# Patient Record
Sex: FEMALE | Race: WHITE | Employment: OTHER | URBAN - METROPOLITAN AREA
[De-identification: names, ages, dates, MRNs, and addresses within clinical notes are randomized per-mention and may not be internally consistent; named-entity substitution may affect disease eponyms.]

---

## 2017-01-04 ENCOUNTER — TELEPHONE (OUTPATIENT)
Dept: ENDOCRINOLOGY | Age: 62
End: 2017-01-04

## 2017-01-26 ENCOUNTER — OFFICE VISIT (OUTPATIENT)
Dept: ENDOCRINOLOGY | Age: 62
End: 2017-01-26

## 2017-01-26 VITALS
SYSTOLIC BLOOD PRESSURE: 136 MMHG | BODY MASS INDEX: 28.77 KG/M2 | WEIGHT: 152.4 LBS | TEMPERATURE: 98.3 F | DIASTOLIC BLOOD PRESSURE: 90 MMHG | HEART RATE: 99 BPM | HEIGHT: 61 IN | OXYGEN SATURATION: 100 %

## 2017-01-26 DIAGNOSIS — M81.0 OSTEOPOROSIS: Primary | ICD-10-CM

## 2017-01-26 PROCEDURE — 99214 OFFICE O/P EST MOD 30 MIN: CPT | Performed by: INTERNAL MEDICINE

## 2017-03-17 ENCOUNTER — TELEPHONE (OUTPATIENT)
Dept: ENDOCRINOLOGY | Age: 62
End: 2017-03-17

## 2017-03-21 ENCOUNTER — TELEPHONE (OUTPATIENT)
Dept: ENDOCRINOLOGY | Age: 62
End: 2017-03-21

## 2017-08-31 ENCOUNTER — TELEPHONE (OUTPATIENT)
Dept: ENDOCRINOLOGY | Age: 62
End: 2017-08-31

## 2017-08-31 DIAGNOSIS — M19.90 ARTHRITIS: Primary | ICD-10-CM

## 2017-09-01 LAB — URIC ACID, SERUM: 5.3 MG/DL (ref 2.6–6)

## 2017-09-02 LAB — VITAMIN D 25-HYDROXY: 67.3 NG/ML

## 2017-09-05 ENCOUNTER — TELEPHONE (OUTPATIENT)
Dept: ENDOCRINOLOGY | Age: 62
End: 2017-09-05

## 2017-09-11 ENCOUNTER — TELEPHONE (OUTPATIENT)
Dept: ENDOCRINOLOGY | Age: 62
End: 2017-09-11

## 2017-09-11 NOTE — TELEPHONE ENCOUNTER
Pt. Called , took shots for osteo, stopped taking for a few days. Hands and joints are not as swollen. Wants to discuss what she should do from here.   Thanks

## 2018-01-24 ENCOUNTER — TELEPHONE (OUTPATIENT)
Dept: ENDOCRINOLOGY | Age: 63
End: 2018-01-24

## 2018-01-26 ENCOUNTER — TELEPHONE (OUTPATIENT)
Dept: ENDOCRINOLOGY | Age: 63
End: 2018-01-26

## 2018-02-07 ENCOUNTER — TELEPHONE (OUTPATIENT)
Dept: ENDOCRINOLOGY | Age: 63
End: 2018-02-07

## 2018-02-13 ENCOUNTER — OFFICE VISIT (OUTPATIENT)
Dept: ENDOCRINOLOGY | Age: 63
End: 2018-02-13

## 2018-02-13 VITALS
BODY MASS INDEX: 27.87 KG/M2 | HEIGHT: 61 IN | OXYGEN SATURATION: 99 % | SYSTOLIC BLOOD PRESSURE: 117 MMHG | RESPIRATION RATE: 18 BRPM | WEIGHT: 147.6 LBS | HEART RATE: 90 BPM | DIASTOLIC BLOOD PRESSURE: 74 MMHG

## 2018-02-13 DIAGNOSIS — M81.0 OSTEOPOROSIS, UNSPECIFIED OSTEOPOROSIS TYPE, UNSPECIFIED PATHOLOGICAL FRACTURE PRESENCE: Primary | ICD-10-CM

## 2018-02-13 PROCEDURE — 99214 OFFICE O/P EST MOD 30 MIN: CPT | Performed by: INTERNAL MEDICINE

## 2018-02-13 NOTE — PROGRESS NOTES
Endocrinology  Josias Billingsley M.D. Phone: [phone]   FAX: [phone]       Myla Ferreira   YOB: 1955    Date of Visit:  2/13/2018    Allergies   Allergen Reactions    Codeine Anaphylaxis     Outpatient Prescriptions Marked as Taking for the 2/13/18 encounter (Office Visit) with Junior Danielle MD   Medication Sig Dispense Refill    ALPRAZolam (XANAX) 0.5 MG tablet Take 0.5 mg by mouth nightly as needed for Sleep      trimethoprim (TRIMPEX) 100 MG tablet Take 100 mg by mouth daily       busPIRone (BUSPAR) 15 MG tablet Take 15 mg by mouth daily Take one half tablet in the AM and one half tablet in the PM      Calcium Citrate-Vitamin D (CITRACAL MAXIMUM PO) Take by mouth Calcium 800 mg , Vitamin [id number] IU  Takes four daily      Ascorbic Acid (VITAMIN C) 500 MG tablet Take 500 mg by mouth daily      Cranberry 500 MG CHEW Take by mouth      Multiple Vitamins-Minerals (THERAPEUTIC MULTIVITAMIN-MINERALS) tablet Take 2 tablets by mouth daily      Fiber, Guar Gum, CHEW Take by mouth      cetirizine (ZYRTEC) 10 MG tablet Take 10 mg by mouth daily           Vitals:    02/13/18 1053   BP: 117/74   Site: Right Arm   Position: Sitting   Cuff Size: Medium Adult   Pulse: 90   Resp: 18   SpO2: 99%   Weight: 147 lb 9.6 oz (67 kg)   Height: 5' 0.83\" (1.545 m)     Body mass index is 28.05 kg/m².      Wt Readings from Last 3 Encounters:   02/13/18 147 lb 9.6 oz (67 kg)   01/26/17 152 lb 6.4 oz (69.1 kg)   07/12/16 143 lb 12.8 oz (65.2 kg)     BP Readings from Last 3 Encounters:   02/13/18 117/74   01/26/17 136/90   07/12/16 116/73        Past Medical History:   Diagnosis Date    Anxiety     Chronic back pain     GERD (gastroesophageal reflux disease)     Irritable bowel syndrome     Urinary incontinence      Past Surgical History:   Procedure Laterality Date    CHOLECYSTECTOMY  1985    GASTRIC BYPASS SURGERY  1987    INCONTINENCE SURGERY  2010    SMALL INTESTINE SURGERY  2000    TUBAL LIGATION      and reversed     Family History   Problem Relation Age of Onset    Arthritis Mother     Asthma Mother     Arthritis Father     Asthma Father     Stroke Father     Arthritis Sister     Asthma Sister     Cancer Sister     Arthritis Brother     Asthma Brother     Diabetes Brother     Heart Disease Brother     High Blood Pressure Brother      History   Smoking Status    Never Smoker   Smokeless Tobacco    Never Used      History   Alcohol Use No       HPI    Rafael Antoine is a 57 y.o. female who is here for a follow-up for the management of osteoporosis. PCP Pastor Travis DO        She has a PMH of anxiety, back pain, gastric bypass 1987 ( s/p revision), lactose tolerance. She was diagnosed with Osteoporsis in 2014 after she had fracture of thoracic spine. DEXA scan in 11/14 showed T score at Lumbar spine -3.0 and left hip -3.8  DEXA scan in 11/15 showed T score of -3.5. At left hip and -3.0 at right. History of fractures : She had fracture of T 5 and T 7 after a simple fall in 2014. She had fracture of lumbar spine ( L2-L3-L4)  in 2015 after lifting her grand child. Pharmacological therapy for Osteoprosis: Prolia 60 mg SQ every 0374-2189  Akefrz63/15---09/17    Prolia 09/17     FH of Osteoporosis : sister has osteoporosis  No FH of hip fracture. Secondary causes of osteoprorsis: No Early Menopause, No smoking, No  prolonged steroid use. Calcium: 600mg/d diet + 1600 mg     __ Milk _x_ Cheese __ Yogurt __ Ice Cream __ Fortified OJ __ Other:     Vitamin D:  4800 Iu daily  Exercise: walks. Follows up regularly with a dentist. No major dental procedures planned. Review of Systems   Constitutional: Positive for malaise/fatigue. Negative for fever, chills, weight loss and diaphoresis. Eyes: Negative for blurred vision, double vision and photophobia. Respiratory: Negative for cough and hemoptysis.     Cardiovascular: Negative for chest pain, palpitations and orthopnea. Genitourinary: Negative for dysuria, urgency, frequency, hematuria and flank pain. Musculoskeletal: Positive for back pain. Negative for myalgias, joint pain, falls and neck pain. Skin: Negative for itching and rash. Neurological: Negative for dizziness, tingling, tremors, sensory change, speech change, focal weakness, seizures, loss of consciousness and headaches. Endo/Heme/Allergies: Negative for environmental allergies and polydipsia. Does not bruise/bleed easily. Psychiatric/Behavioral: Negative for depression, suicidal ideas, hallucinations, memory loss and substance abuse. The patient is not nervous/anxious and does not have insomnia. Physical Exam   Constitutional: She is oriented to person, place, and time. She appears well-developed. No distress. HENT:   Mouth/Throat: Oropharynx is clear and moist.   Eyes: EOM are normal.   Neck: No thyromegaly present. Cardiovascular: Normal rate and normal heart sounds. Pulmonary/Chest: Effort normal. No respiratory distress. She has no wheezes. Abdominal: Soft. Bowel sounds are normal. There is no tenderness. Musculoskeletal: She exhibits no edema. Neurological: She is alert and oriented to person, place, and time. Skin: Skin is warm and dry. She is not diaphoretic. Psychiatric: Her behavior is normal. Thought content normal.           EXAMINATION: BD-BONE DENSITY DEXA AXIAL SKELETON      DEMOGRAPHICS: 62years old Female      INDICATION: M81.0: Age-related osteoporosis without current pathological fracture    History: screening . COMPARISON: Bone densitometry 1/14/2014      TECHNIQUE : The patient's lumbar spine and hips were scanned with dual-energy x-ray    absorptiometry (DXA) utilizing a [employer] unit. Lumbar spine: Analysis is limited secondary to presence of kyphoplasty material..      Left Hip: Analysis of the total left hip shows a bone mineral density of 0.573 gm/cm2.  This

## 2018-06-07 ENCOUNTER — TELEPHONE (OUTPATIENT)
Dept: ENDOCRINOLOGY | Age: 63
End: 2018-06-07

## 2018-11-12 ENCOUNTER — TELEPHONE (OUTPATIENT)
Dept: ENDOCRINOLOGY | Age: 63
End: 2018-11-12

## 2018-11-14 ENCOUNTER — TELEPHONE (OUTPATIENT)
Dept: ENDOCRINOLOGY | Age: 63
End: 2018-11-14

## 2018-11-14 DIAGNOSIS — M81.0 OSTEOPOROSIS, UNSPECIFIED OSTEOPOROSIS TYPE, UNSPECIFIED PATHOLOGICAL FRACTURE PRESENCE: Primary | ICD-10-CM

## 2018-11-14 DIAGNOSIS — E55.9 VITAMIN D DEFICIENCY: ICD-10-CM

## 2018-11-14 NOTE — TELEPHONE ENCOUNTER
Pt said the doctor wanted labs done I dont see any in system she needs them put in and faxed to [facility] at [phone]

## 2018-12-21 LAB
A/G RATIO: 1.1 (ref 1–2)
ALBUMIN SERPL-MCNC: 7.4 G/DL (ref 6.4–8.2)
ALBUMIN SERUM: 3.9 G/DL (ref 3.4–5)
ALP BLD-CCNC: 52 U/L (ref 45–117)
ALT SERPL-CCNC: 20 U/L (ref 12–78)
ANION GAP SERPL CALCULATED.3IONS-SCNC: 9 MMOL/L (ref 7–16)
AST SERPL-CCNC: 20 U/L (ref 15–37)
BILIRUBIN: 0.6 MG/DL (ref 0.2–1)
BUN BLDV-MCNC: 14 MG/DL (ref 7–18)
CALCIUM SERPL-MCNC: 9.6 MG/DL (ref 8.5–10.1)
CHLORIDE BLD-SCNC: 103 MMOL/L (ref 98–107)
CO2: 30 MMOL/L (ref 21–32)
CREATININE + EGFR PANEL: 0.8 MG/DL (ref 0.6–1.3)
GFR AFRICAN AMERICAN: > 60 ML/MIN/1.73M2
GFR NON-AFRICAN AMERICAN: > 60 ML/MIN/1.73M2
GLOBULIN: 3.5 G/DL (ref 2.6–4.2)
GLUCOSE: 117 MG/DL (ref 74–106)
POTASSIUM SERPL-SCNC: 5 MMOL/L (ref 3.5–5.1)
SODIUM BLD-SCNC: 142 MMOL/L (ref 136–145)
VITAMIN D 25-HYDROXY: 91.7 NG/ML

## 2018-12-27 ENCOUNTER — OFFICE VISIT (OUTPATIENT)
Dept: ORTHOPEDIC SURGERY | Age: 63
End: 2018-12-27
Payer: MEDICARE

## 2018-12-27 VITALS
HEART RATE: 78 BPM | HEIGHT: 61 IN | DIASTOLIC BLOOD PRESSURE: 68 MMHG | SYSTOLIC BLOOD PRESSURE: 136 MMHG | BODY MASS INDEX: 27.89 KG/M2 | WEIGHT: 147.71 LBS

## 2018-12-27 DIAGNOSIS — M19.272 SECONDARY OSTEOARTHRITIS, LEFT ANKLE AND FOOT: Primary | ICD-10-CM

## 2018-12-27 PROBLEM — M19.171 POST-TRAUMATIC OSTEOARTHRITIS, RIGHT ANKLE AND FOOT: Status: ACTIVE | Noted: 2018-12-27

## 2018-12-27 PROCEDURE — 99203 OFFICE O/P NEW LOW 30 MIN: CPT | Performed by: PODIATRIST

## 2018-12-27 RX ORDER — CIPROFLOXACIN 500 MG/1
TABLET, FILM COATED ORAL
COMMUNITY
End: 2019-02-19 | Stop reason: ALTCHOICE

## 2018-12-27 RX ORDER — MELOXICAM 7.5 MG/1
TABLET ORAL
COMMUNITY
End: 2019-02-19 | Stop reason: ALTCHOICE

## 2018-12-27 RX ORDER — TRAMADOL HYDROCHLORIDE 50 MG/1
TABLET ORAL
COMMUNITY
End: 2019-02-19 | Stop reason: ALTCHOICE

## 2018-12-27 RX ORDER — OSELTAMIVIR PHOSPHATE 75 MG/1
CAPSULE ORAL
COMMUNITY
End: 2019-02-19 | Stop reason: ALTCHOICE

## 2018-12-27 RX ORDER — HYDROCODONE BITARTRATE AND ACETAMINOPHEN 5; 325 MG/1; MG/1
TABLET ORAL
COMMUNITY
End: 2019-02-19 | Stop reason: ALTCHOICE

## 2018-12-27 NOTE — PROGRESS NOTES
HISTORY OF PRESENT ILLNESS:  This is an intial visit for [] with a chief complaint of pain to the top of the left foot and the front of the left ankle. She presents today as a 2nd opinion on treatment. She's had chronic ankle and foot pain her entire life. This stems from sustaining a bad ankle fracture when she was 17. Within 10 years, she had her left ankle fused. More recently within the last couple of years she's had sporadic bouts of pain. She describes the pain is mainly a dull achy sensation that can increase with activity. She states that it does not occur every day and there are times where she will not have any problems with activity but then again there are times where it is debilitating. She denies any history of new injury. It was proposed to her that the fixation from the ankle fusion the removed. FAMILY HISTORY:  Documented in chart. SOCIAL HISTORY:  Documented in chart. REVIEW OF SYSTEMS:  The patient denies any problems with cardiovascular, pulmonary, gastrointestinal, neurologic, urologic, genitourinary, psychiatric, dermatologic, and HEENT systems. PHYSICAL EXAM:  The majority of palpable tenderness is over the dorsum of the left midfoot and anterior aspect of left ankle. This is relatively mild. .  There is very mild edema present. There is no erythema or ecchymosis present. The right foot exam is unremarkable. The patient has palpable pedal pulses bilateral.  The sensation is grossly intact bilateral.    X-RAYS: 3 weightbearing views of the left ankle were reviewed. These demonstrates union of the tibiotalar joint with 3 screw fixation. There does not appear to be any signs of loosening of the screws. She does demonstrate moderate to severe osteoarthritic changes at the left midfoot. There are no acute fractures or dislocations noted. ASSESSMENT:  Midfoot synovitis and Osteoarthritis, left foot. Status post left ankle fusion.       PLAN:  I educated the

## 2019-02-19 ENCOUNTER — OFFICE VISIT (OUTPATIENT)
Dept: ENDOCRINOLOGY | Age: 64
End: 2019-02-19
Payer: MEDICARE

## 2019-02-19 VITALS
OXYGEN SATURATION: 99 % | SYSTOLIC BLOOD PRESSURE: 122 MMHG | BODY MASS INDEX: 26.24 KG/M2 | WEIGHT: 139 LBS | HEIGHT: 61 IN | DIASTOLIC BLOOD PRESSURE: 76 MMHG | HEART RATE: 77 BPM

## 2019-02-19 DIAGNOSIS — E55.9 VITAMIN D DEFICIENCY: ICD-10-CM

## 2019-02-19 DIAGNOSIS — M81.0 OSTEOPOROSIS, UNSPECIFIED OSTEOPOROSIS TYPE, UNSPECIFIED PATHOLOGICAL FRACTURE PRESENCE: Primary | ICD-10-CM

## 2019-02-19 DIAGNOSIS — E03.9 ACQUIRED HYPOTHYROIDISM: ICD-10-CM

## 2019-02-19 PROCEDURE — 99214 OFFICE O/P EST MOD 30 MIN: CPT | Performed by: INTERNAL MEDICINE

## 2019-02-19 RX ORDER — FLUTICASONE PROPIONATE 50 MCG
1 SPRAY, SUSPENSION (ML) NASAL DAILY
COMMUNITY

## 2019-02-19 RX ORDER — LEVOTHYROXINE SODIUM 0.05 MG/1
75 TABLET ORAL DAILY
COMMUNITY
Start: 2019-02-16

## 2019-02-19 RX ORDER — FLUTICASONE PROPIONATE 50 MCG
SPRAY, SUSPENSION (ML) NASAL PRN
COMMUNITY
Start: 2018-12-29 | End: 2019-02-19

## 2019-02-22 LAB
T4 FREE: 1.01 NG/DL (ref 0.76–1.46)
TSH SERPL DL<=0.05 MIU/L-ACNC: 0.91 UIU/ML (ref 0.36–3.74)

## 2019-08-27 ENCOUNTER — TELEPHONE (OUTPATIENT)
Dept: ENDOCRINOLOGY | Age: 64
End: 2019-08-27

## 2019-08-27 NOTE — TELEPHONE ENCOUNTER
Patient left a voicemail stating she is getting a shingles vaccine soon and she is scheduled for a prolia on 9-15. She wants to know if those to will conflict with each other or have a reaction.  Please call patient back

## 2019-10-21 ENCOUNTER — OFFICE VISIT (OUTPATIENT)
Dept: ENDOCRINOLOGY | Age: 64
End: 2019-10-21
Payer: MEDICARE

## 2019-10-21 VITALS
WEIGHT: 131.8 LBS | HEART RATE: 98 BPM | OXYGEN SATURATION: 100 % | SYSTOLIC BLOOD PRESSURE: 132 MMHG | HEIGHT: 61 IN | DIASTOLIC BLOOD PRESSURE: 82 MMHG | BODY MASS INDEX: 24.88 KG/M2

## 2019-10-21 DIAGNOSIS — E55.9 VITAMIN D DEFICIENCY: ICD-10-CM

## 2019-10-21 DIAGNOSIS — M81.0 OSTEOPOROSIS, UNSPECIFIED OSTEOPOROSIS TYPE, UNSPECIFIED PATHOLOGICAL FRACTURE PRESENCE: Primary | ICD-10-CM

## 2019-10-21 DIAGNOSIS — E03.9 ACQUIRED HYPOTHYROIDISM: ICD-10-CM

## 2019-10-21 PROCEDURE — 99214 OFFICE O/P EST MOD 30 MIN: CPT | Performed by: INTERNAL MEDICINE

## 2019-10-21 RX ORDER — CELECOXIB 200 MG/1
CAPSULE ORAL
Refills: 2 | COMMUNITY
Start: 2019-09-19 | End: 2020-10-15 | Stop reason: SINTOL

## 2019-10-31 ENCOUNTER — OFFICE VISIT (OUTPATIENT)
Dept: ORTHOPEDIC SURGERY | Age: 64
End: 2019-10-31
Payer: MEDICARE

## 2019-10-31 VITALS
DIASTOLIC BLOOD PRESSURE: 71 MMHG | HEART RATE: 96 BPM | HEIGHT: 61 IN | WEIGHT: 131.84 LBS | BODY MASS INDEX: 24.89 KG/M2 | SYSTOLIC BLOOD PRESSURE: 111 MMHG

## 2019-10-31 DIAGNOSIS — R20.0 NUMBNESS OF LEFT FOOT: Primary | ICD-10-CM

## 2019-10-31 PROCEDURE — 99213 OFFICE O/P EST LOW 20 MIN: CPT | Performed by: PODIATRIST

## 2020-02-11 ENCOUNTER — TELEPHONE (OUTPATIENT)
Dept: ENDOCRINOLOGY | Age: 65
End: 2020-02-11

## 2020-02-11 NOTE — TELEPHONE ENCOUNTER
pcp office called and said the patient can not do the stress test with the treadmill. They asked that a new order be put in for a regular stress test due to her back pain. They said they tried to put it in the dx code for family history of CHF. Insurance would not approve it.      [facility]  [phone]

## 2020-03-30 ENCOUNTER — TELEPHONE (OUTPATIENT)
Dept: ENDOCRINOLOGY | Age: 65
End: 2020-03-30

## 2020-03-30 NOTE — TELEPHONE ENCOUNTER
Patient called and said [facility] is not approving dr. Elfredia Kussmaul to give the evenity shot. She would like to talk to [address] [place] [address] Drive office about this to see if its ok that she has not gotten it yet.

## 2020-04-01 NOTE — TELEPHONE ENCOUNTER
Patient stated the insurance company called her and said the evenity has been approved.  The patient would like to talk about this

## 2020-04-06 ENCOUNTER — NURSE ONLY (OUTPATIENT)
Dept: ENDOCRINOLOGY | Age: 65
End: 2020-04-06
Payer: MEDICARE

## 2020-04-06 PROCEDURE — 96372 THER/PROPH/DIAG INJ SC/IM: CPT | Performed by: INTERNAL MEDICINE

## 2020-04-06 NOTE — PROGRESS NOTES
Evenity 210mg adminstered subcutaneou at 105 mg in each arm. [facility] [phone]       Lot [id number]      Exp. 9/22    Advised pt to wait in the waiting room to wait 20 mins to make sure of no reactions to Evenity.

## 2020-04-20 ENCOUNTER — VIRTUAL VISIT (OUTPATIENT)
Dept: ENDOCRINOLOGY | Age: 65
End: 2020-04-20
Payer: MEDICARE

## 2020-04-20 PROCEDURE — 99214 OFFICE O/P EST MOD 30 MIN: CPT | Performed by: INTERNAL MEDICINE

## 2020-04-20 NOTE — PROGRESS NOTES
Endocrinology  [facility] M.D. Phone: [phone]   FAX: [phone]       Sally Hollingsworth   YOB: 1955    Date of Visit:  4/20/2020    Allergies   Allergen Reactions    Codeine Anaphylaxis, Nausea Only, Other (See Comments) and Rash     Chest tightness    Ciprofloxacin Rash     Outpatient Medications Marked as Taking for the 4/20/20 encounter (Virtual Visit) with Desiree Stacy MD   Medication Sig Dispense Refill    celecoxib (CELEBREX) 200 MG capsule TAKE 1 CAPSULE BY MOUTH ONCE DAILY WITH BREAKFAST  2    levothyroxine (SYNTHROID) 50 MCG tablet Take 75 mcg by mouth Daily       fluticasone (FLONASE) 50 MCG/ACT nasal spray 1 spray by Each Nare route daily      NONFORMULARY Place 125 mg under the tongue daily Hemp oil - sublingual (2-3 drops once daily)      ALPRAZolam (XANAX) 0.5 MG tablet Take 0.5 mg by mouth nightly as needed for Sleep      busPIRone (BUSPAR) 15 MG tablet Take 15 mg by mouth daily Take one half tablet in the AM and one half tablet in the PM      Calcium Citrate-Vitamin D (CITRACAL MAXIMUM PO) Take by mouth Calcium 800 mg , Vitamin [id number] IU  Takes four daily      Ascorbic Acid (VITAMIN C) 500 MG tablet Take 500 mg by mouth daily      Cranberry 500 MG CHEW Take by mouth      Multiple Vitamins-Minerals (THERAPEUTIC MULTIVITAMIN-MINERALS) tablet Take 2 tablets by mouth daily      Fiber, Guar Gum, CHEW Take by mouth      cetirizine (ZYRTEC) 10 MG tablet Take 10 mg by mouth daily           There were no vitals filed for this visit. There is no height or weight on file to calculate BMI.      Wt Readings from Last 3 Encounters:   10/31/19 131 lb 13.4 oz (59.8 kg)   10/21/19 131 lb 12.8 oz (59.8 kg)   02/19/19 139 lb (63 kg)     BP Readings from Last 3 Encounters:   10/31/19 111/71   10/21/19 132/82   02/19/19 122/76        Past Medical History:   Diagnosis Date    Anxiety     Chronic back pain     GERD (gastroesophageal reflux disease)     Irritable therapy for Osteoprosis:   Prolia 60 mg SQ every 2423-1924  Hudamh41/15---09/17    Prolia 09/17, 03/18. 09/18, 03/19, 09/19    Evenity 04/20    She had aches and pains with last Prolia shot      FH of Osteoporosis : sister has osteoporosis  No FH of hip fracture. Secondary causes of osteoprorsis: No Early Menopause, No smoking, No  prolonged steroid use. Calcium: 600mg/d diet + 1200 mg     __ Milk _x_ Cheese __ Yogurt __ Ice Cream __ Fortified OJ __ Other:     Vitamin D:  1000 Iu daily ( off it)  Exercise: walks. Follows up regularly with a dentist. No major dental procedures planned. She has hypothyroidism     She is on levothyroxine 50 mcg daily. Review of Systems   Constitutional: Positive for malaise/fatigue. Negative for fever, chills, weight loss and diaphoresis. Eyes: Negative for blurred vision, double vision and photophobia. Respiratory: Negative for cough and hemoptysis. Cardiovascular: Negative for chest pain, palpitations and orthopnea. Genitourinary: Negative for dysuria, urgency, frequency, hematuria and flank pain. Musculoskeletal: Positive for back pain. Negative for myalgias, joint pain, falls and neck pain. Skin: Negative for itching and rash. Neurological: Negative for dizziness, tingling, tremors, sensory change, speech change, focal weakness, seizures, loss of consciousness and headaches. Endo/Heme/Allergies: Negative for environmental allergies and polydipsia. Does not bruise/bleed easily. Psychiatric/Behavioral: Negative for depression, suicidal ideas, hallucinations, memory loss and substance abuse. The patient is not nervous/anxious and does not have insomnia. EXAMINATION: BD-BONE DENSITY DEXA AXIAL SKELETON      DEMOGRAPHICS: 62years old Female      INDICATION: M81.0: Age-related osteoporosis without current pathological fracture    History: screening .       COMPARISON: Bone densitometry 1/14/2014      TECHNIQUE : The patient's lumbar osteoporosis. She had compression fractures of thoracic spine in 2014 and Lumbar spine in 2015. DEXA scan in 01/14 and 11/15  showed osteoporosis. She had gastric bypass in the past.    Labs in 10/15 done at [facility] system showed normal CBC, renal function, calcium,  Phos. Normal Vit D level, PTH and calcium levels. 24 hr urine calcium normal.       She used  forteo 20 mcg  12/15 -09/17. Stopped as was having joint swelling  Had prolia in Prolia 09/17, 03/18. 09/18    DEXA scan in 01/18 showed osteoporosis with T score of -3.5 in lumbar spine and -3.3 in right hip. Repeat DEXA scan in 01/19 showed T score 0.2 in lumbar spine ( 26 % increase)  Bone density has also improved in Right and left hip. Was on Prolia  Last shot was in 09/19    DEXA scan in 01/20 showed stable bone density     She is now on Romosuzumab ( evinity) since 04/20      Will need treatment with reclast or prolia after 12 months of Evenity    Will repeat DEXA scan in 01/21        2. Compression fracture of the lumbar spine. S/p kyphoplasty,  As per Dr. Donna Mullins at [facility]. 3. Vitamin D def    Vit  99.2---> 91---> 77.8 ---> 99.2    Off vitamin D. Taking vitamin D with her calcium    Advised to stop calcium + D    Only take calcium 600 mg daily       4. Hypothyroidism . On levothyroxine.  Managed by PCP

## 2020-05-06 ENCOUNTER — NURSE ONLY (OUTPATIENT)
Dept: ENDOCRINOLOGY | Age: 65
End: 2020-05-06
Payer: MEDICARE

## 2020-05-06 PROCEDURE — 96372 THER/PROPH/DIAG INJ SC/IM: CPT | Performed by: INTERNAL MEDICINE

## 2020-05-06 NOTE — PATIENT INSTRUCTIONS
Evenity 210mg adminstered subcutaneou at 105 mg in each arm. [facility]  [id number]     Lot [id number]            Exp. 09/2022  Evenity supplied by the physician office. It is the patient's responsibility to notify the physician office of new insurance plan prior to appointment to prevent delay in treatment. Please send a copy of the front and back of the insurance card either by fax, mail or stop by the office. Yes

## 2020-06-11 ENCOUNTER — NURSE ONLY (OUTPATIENT)
Dept: ENDOCRINOLOGY | Age: 65
End: 2020-06-11
Payer: MEDICARE

## 2020-06-11 PROCEDURE — 96372 THER/PROPH/DIAG INJ SC/IM: CPT | Performed by: INTERNAL MEDICINE

## 2020-07-13 ENCOUNTER — NURSE ONLY (OUTPATIENT)
Dept: ENDOCRINOLOGY | Age: 65
End: 2020-07-13
Payer: MEDICARE

## 2020-07-13 PROCEDURE — 96372 THER/PROPH/DIAG INJ SC/IM: CPT | Performed by: INTERNAL MEDICINE

## 2020-07-13 NOTE — PROGRESS NOTES
Evenity 210mg adminstered subcutaneou at 105 mg in each arm.   [address]: [id number]     Lot   [id number]          Exp. 11/22    Given by Sparkle Colón RN

## 2020-08-13 ENCOUNTER — NURSE ONLY (OUTPATIENT)
Dept: ENDOCRINOLOGY | Age: 65
End: 2020-08-13
Payer: MEDICARE

## 2020-08-13 PROCEDURE — 96372 THER/PROPH/DIAG INJ SC/IM: CPT | Performed by: INTERNAL MEDICINE

## 2020-08-13 NOTE — PROGRESS NOTES
Evenity 210mg adminstered subcutaneou at 105 mg in each arm.   [address]   [id number]     Lot  [id number]          Exp. 11/2022

## 2020-09-10 ENCOUNTER — NURSE ONLY (OUTPATIENT)
Dept: ENDOCRINOLOGY | Age: 65
End: 2020-09-10
Payer: MEDICARE

## 2020-09-10 PROCEDURE — 96372 THER/PROPH/DIAG INJ SC/IM: CPT | Performed by: INTERNAL MEDICINE

## 2020-09-10 NOTE — PROGRESS NOTES
Evenity 210mg adminstered subcutaneou at 105 mg in each arm. [address] [id number]       Lot [id number]            Exp 11/22.

## 2020-09-16 ENCOUNTER — TELEPHONE (OUTPATIENT)
Dept: ENDOCRINOLOGY | Age: 65
End: 2020-09-16
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
## 2020-10-15 ENCOUNTER — OFFICE VISIT (OUTPATIENT)
Dept: ENDOCRINOLOGY | Age: 65
End: 2020-10-15
Payer: MEDICARE

## 2020-10-15 VITALS
HEIGHT: 61 IN | OXYGEN SATURATION: 98 % | SYSTOLIC BLOOD PRESSURE: 134 MMHG | DIASTOLIC BLOOD PRESSURE: 76 MMHG | BODY MASS INDEX: 26.92 KG/M2 | WEIGHT: 142.6 LBS | HEART RATE: 96 BPM

## 2020-10-15 PROCEDURE — 96372 THER/PROPH/DIAG INJ SC/IM: CPT | Performed by: INTERNAL MEDICINE

## 2020-10-15 PROCEDURE — 99214 OFFICE O/P EST MOD 30 MIN: CPT | Performed by: INTERNAL MEDICINE

## 2020-10-15 NOTE — PROGRESS NOTES
Endocrinology  Em Perea M.D. Phone: [phone]   FAX: [phone]       Mehrdad Arrington   YOB: 1955    Date of Visit:  10/15/2020    Allergies   Allergen Reactions    Codeine Anaphylaxis, Nausea Only, Other (See Comments) and Rash     Chest tightness    Ciprofloxacin Rash     Outpatient Medications Marked as Taking for the 10/15/20 encounter (Office Visit) with Prabhjot Mosquera MD   Medication Sig Dispense Refill    levothyroxine (SYNTHROID) 50 MCG tablet Take 75 mcg by mouth Daily       fluticasone (FLONASE) 50 MCG/ACT nasal spray 1 spray by Each Nare route daily      NONFORMULARY Place 125 mg under the tongue daily Hemp oil - sublingual (2-3 drops once daily)      ALPRAZolam (XANAX) 0.5 MG tablet Take 0.5 mg by mouth nightly as needed for Sleep      busPIRone (BUSPAR) 15 MG tablet Take 15 mg by mouth daily Take one half tablet in the AM and one half tablet in the PM      Calcium Citrate-Vitamin D (CITRACAL MAXIMUM PO) Take by mouth Calcium 800 mg , Vitamin E13140 IU  Takes four daily      Ascorbic Acid (VITAMIN C) 500 MG tablet Take 500 mg by mouth daily      Cranberry 500 MG CHEW Take by mouth      Multiple Vitamins-Minerals (THERAPEUTIC MULTIVITAMIN-MINERALS) tablet Take 2 tablets by mouth daily      Fiber, Guar Gum, CHEW Take by mouth      cetirizine (ZYRTEC) 10 MG tablet Take 10 mg by mouth daily           Vitals:    10/15/20 1400 10/15/20 1404   BP: (!) 141/76 134/76   Site: Left Upper Arm Right Upper Arm   Position: Sitting Sitting   Cuff Size: Medium Adult Medium Adult   Pulse: 96    SpO2: 98%    Weight: 142 lb 9.6 oz (64.7 kg)    Height: 5' 0.87\" (1.546 m)      Body mass index is 27.06 kg/m².      Wt Readings from Last 3 Encounters:   10/15/20 142 lb 9.6 oz (64.7 kg)   10/31/19 131 lb 13.4 oz (59.8 kg)   10/21/19 131 lb 12.8 oz (59.8 kg)     BP Readings from Last 3 Encounters:   10/15/20 134/76   10/31/19 111/71   10/21/19 132/82        Past Medical History: Diagnosis Date    Anxiety     Chronic back pain     GERD (gastroesophageal reflux disease)     Irritable bowel syndrome     Post-traumatic osteoarthritis, right ankle and foot 12/27/2018    Urinary incontinence      Past Surgical History:   Procedure Laterality Date    CHOLECYSTECTOMY  1985    GASTRIC BYPASS SURGERY  1987    INCONTINENCE SURGERY  2010    SMALL INTESTINE SURGERY  2000    TUBAL LIGATION      and reversed     Family History   Problem Relation Age of Onset    Arthritis Mother     Asthma Mother     Arthritis Father     Asthma Father     Stroke Father     Arthritis Sister     Asthma Sister     Cancer Sister     Arthritis Brother     Asthma Brother     Diabetes Brother     Heart Disease Brother     High Blood Pressure Brother      Social History     Tobacco Use   Smoking Status Never Smoker   Smokeless Tobacco Never Used      Social History     Substance and Sexual Activity   Alcohol Use No       HPI    Lilliana Tijerina is a 75 y.o. female who was seen for the management of osteoporosis. PCP Nanette Sands DO            She has a PMH of anxiety, back pain, gastric bypass 1987 ( s/p revision), lactose tolerance, hypothyroidism. She was diagnosed with Osteoporsis in 2014 after she had fracture of thoracic spine. DEXA scan in 11/14 showed T score at Lumbar spine -3.0 and left hip -3.8  DEXA scan in 11/15 showed T score of -3.5. At left hip and -3.0 at right. DEXA scan in 2016 and 2018 showed bone density to be stable    Most recent DEXA scan in 01/19 showed bone density    History of fractures : She had fracture of T 5 and T 7 after a simple fall in 2014. She had fracture of lumbar spine ( L2-L3-L4)  in 2015 after lifting her grand child.      Pharmacological therapy for Osteoprosis:   Prolia 60 mg SQ every [id number] Forteo12/15---09/17    Prolia 09/17, 03/18. 09/18, 03/19, 09/19    Evenity 04/20    She had aches and pains with last Prolia shot      FH of Osteoporosis : sister has osteoporosis  No FH of hip fracture. Secondary causes of osteoprorsis: No Early Menopause, No smoking, No  prolonged steroid use. Calcium: 600mg/d diet + 1200 mg     __ Milk _x_ Cheese __ Yogurt __ Ice Cream __ Fortified OJ __ Other:     Vitamin D:  1000 Iu daily ( off it)  Exercise: walks. Follows up regularly with a dentist. No major dental procedures planned. She has hypothyroidism     She is on levothyroxine 50 mcg daily. Review of Systems   Constitutional: Positive for malaise/fatigue. Negative for fever, chills, weight loss and diaphoresis. Eyes: Negative for blurred vision, double vision and photophobia. Respiratory: Negative for cough and hemoptysis. Cardiovascular: Negative for chest pain, palpitations and orthopnea. Genitourinary: Negative for dysuria, urgency, frequency, hematuria and flank pain. Musculoskeletal: Positive for back pain. Negative for myalgias, joint pain, falls and neck pain. Skin: Negative for itching and rash. Neurological: Negative for dizziness, tingling, tremors, sensory change, speech change, focal weakness, seizures, loss of consciousness and headaches. Endo/Heme/Allergies: Negative for environmental allergies and polydipsia. Does not bruise/bleed easily. Psychiatric/Behavioral: Negative for depression, suicidal ideas, hallucinations, memory loss and substance abuse. The patient is not nervous/anxious and does not have insomnia. EXAMINATION: BD-BONE DENSITY DEXA AXIAL SKELETON      DEMOGRAPHICS: 62years old Female      INDICATION: M81.0: Age-related osteoporosis without current pathological fracture    History: screening . COMPARISON: Bone densitometry 1/14/2014      TECHNIQUE : The patient's lumbar spine and hips were scanned with dual-energy x-ray    absorptiometry (DXA) utilizing a [employer] unit. Lumbar spine: Analysis is limited secondary to presence of kyphoplasty material.. Left Hip: Analysis of the total left hip shows a bone mineral density of 0.573 gm/cm2. This    correlates with a T-score of -3.5. This is not significantly changed. Right Hip: Analysis of the total right hip shows a bone mineral density of 0.629 gm/cm2. This   correlates with a T-score of -3.0. This has significantly increased compared to the previous    study. T score interpretation according to the [address] [place] [address]:      T-score at or above -1.0 Normal      T-score between -1.0 and -2.5 osteopenia      T-score at or below -2.5 osteoporosis      [IMPRESSION]      1. Osteoporosis   2. There has been a significant increase of the bone mineral density in the total right hip    compared to the previous study        No visits with results within 3 Month(s) from this visit.    Latest known visit with results is:   Orders Only on 09/16/2019   Component Date Value Ref Range Status    TSH 09/16/2019 1.567  0.358 - 3.740 UIU/ML Final    Sodium 09/16/2019 142  136 - 145 mmol/L Final    Potassium 09/16/2019 5.0  3.5 - 5.1 MMOL/L Final    Chloride 09/16/2019 106  98 - 107 mmol/L Final    CO2 09/16/2019 28  21 - 32 mmol/L Final    Anion Gap 09/16/2019 8  7 - 16 mmol/L Final    Glucose 09/16/2019 117* 74 - 106 mg/dL Final    BUN 09/16/2019 18  7 - 18 mg/dL Final    Creatinine + eGFR Panel 09/16/2019 0.7  0.6 - 1.3 mg/dL Final    Calcium 09/16/2019 9.1  8.5 - 10.1 mg/dL Final    AST 09/16/2019 21  15 - 37 U/L Final    ALT 09/16/2019 13  12 - 78 U/L Final    Alkaline Phosphatase 09/16/2019 52  45 - 117 U/L Final    Alb 09/16/2019 7.0  6.4 - 8.2 g/dL Final    Albumin,Serum 09/16/2019 3.7  3.4 - 5.0 g/dL Final    Bilirubin 09/16/2019 0.5  0.2 - 1.0 MG/DL Final    Albumin/Globulin Ratio 09/16/2019 1.1  1.0 - 2.0 Final    Globulin 09/16/2019 3.3  2.6 - 4.2 g/dL Final    GFR  09/16/2019 > 60  >60 ML/MIN/1.73M2 Final    Comment: GFR is estimated using creatinine, age, gender, fracture, lumbar compression fractures, history of adult fracture    DEMOGRAPHICS: 70years old Female     COMPARISON: 1/19/2018    TECHNIQUE : The patient's lumbar spine and hips were scanned with dual-energy x-ray absorptiometry (DXA) utilizing a GE [employer] [id number] DXA system at [facility]. Computer-generated images of these regions were satisfactory for interpretation. FINDINGS:    Lumbar spine: Analysis of L1-L4 shows a bone mineral density of 1.210 gm/cm2. This correlates with a T-score of 0.2. Multilevel degenerative sclerosis is present. This may spuriously elevate bone mineral density at the lumbar spine. Left Hip: Analysis of the total shows a bone mineral density of 0.625 gm/cm2. This correlates with a T-score of -3.0. Right Hip: Analysis of the  total shows a bone mineral density of 0.613 gm/cm2. This correlates with a T-score of -3.1. T score interpretation according to the [address] [place] [address]:    T-score at or above  -1.0  Normal    T-score  between  -1.0  and  -2.5  osteopenia    T-score at or below  -2.5   osteoporosis      IMPRESSION:   1. Osteoporosis. The bone mineral density is compatible with osteoporosis and represents significant increased risk of fracture. 2.  The FRAX 10 year probability for a major osteoporotic fracture is not reported in normal or osteoporotic bone mineral densities. 3.  There is an interval increase in bone mineral density  in the lumbar spine of +0.251 g/cm2 or +26.2 %. This represents a statistically significant change and is secondary to degenerative changes noted in the lumbar spine. Assessment/Plan       1. Osteoporosis    This 75 yrs old female has severe osteoporosis. She had compression fractures of thoracic spine in 2014 and Lumbar spine in 2015. DEXA scan in 01/14 and 11/15  showed osteoporosis.   She had gastric bypass in the past.    Labs in 10/15 done at [facility] showed normal CBC, renal function, calcium,  Phos. Normal Vit D level, PTH and calcium levels. 24 hr urine calcium normal.       She used  forteo 20 mcg  12/15 -09/17. Stopped as was having joint swelling  Had prolia in Prolia 09/17, 03/18. 09/18    DEXA scan in 01/18 showed osteoporosis with T score of -3.5 in lumbar spine and -3.3 in right hip. Repeat DEXA scan in 01/19 showed T score 0.2 in lumbar spine ( 26 % increase)  Bone density has also improved in Right and left hip. Was on Prolia  Last shot was in 09/19    DEXA scan in 01/20 showed stable bone density     She is now on Romosuzumab ( evinity) since 04/20    Experienced more aches and pains after last shot     Was given a shot today   Will need treatment with prolia after 12 months of Evenity  Will repeat DEXA scan in 01/21        2. Compression fracture of the lumbar spine. S/p kyphoplasty,  As per Dr. Issac Fernando at [facility]. 3. Vitamin D def    Vit  99.2---> 91---> 77.8 ---> 99.2---> 85    Off vitamin D. Taking vitamin D with her calcium    Advised to stop calcium + D    Only take calcium 600 mg daily       4. Hypothyroidism . On levothyroxine. TSH 1.675 in 08/20. Patient will follow-up with Dr. Harry Altamirano in [place], [place] ( close to her home ) or Dr. Tana Arrieta based on insurance coverage.

## 2020-11-05 ENCOUNTER — OFFICE VISIT (OUTPATIENT)
Dept: ORTHOPEDIC SURGERY | Age: 65
End: 2020-11-05
Payer: MEDICARE

## 2020-11-05 VITALS — WEIGHT: 140 LBS | HEIGHT: 60 IN | BODY MASS INDEX: 27.48 KG/M2

## 2020-11-05 PROCEDURE — 99213 OFFICE O/P EST LOW 20 MIN: CPT | Performed by: PODIATRIST

## 2020-11-05 PROCEDURE — L3040 FT ARCH SUPRT PREMOLD LONGIT: HCPCS | Performed by: PODIATRIST

## 2020-11-12 ENCOUNTER — NURSE ONLY (OUTPATIENT)
Dept: ENDOCRINOLOGY | Age: 65
End: 2020-11-12
Payer: MEDICARE

## 2020-11-12 PROCEDURE — 96372 THER/PROPH/DIAG INJ SC/IM: CPT | Performed by: INTERNAL MEDICINE

## 2020-11-12 NOTE — PROGRESS NOTES
Evenity 210mg adminstered subcutaneou at 105 mg in each arm.   Kimberlyn Coley 45 : [phone]    Lot [id number]      Exp.03/23

## 2020-12-17 ENCOUNTER — NURSE ONLY (OUTPATIENT)
Dept: ENDOCRINOLOGY | Age: 65
End: 2020-12-17
Payer: MEDICARE

## 2020-12-17 PROCEDURE — 96372 THER/PROPH/DIAG INJ SC/IM: CPT | Performed by: INTERNAL MEDICINE

## 2020-12-17 NOTE — PROGRESS NOTES
Evenity administered     Bilateral arms sub Q   105mg in each arm for a total of 210mg     Office supplied

## 2021-01-19 ENCOUNTER — TELEPHONE (OUTPATIENT)
Dept: ENDOCRINOLOGY | Age: 66
End: 2021-01-19

## 2021-01-19 NOTE — TELEPHONE ENCOUNTER
Submitted PA for Evenity today 1/19/2021 through [employer]. Will check on the status throughout the day.

## 2021-01-20 ENCOUNTER — NURSE ONLY (OUTPATIENT)
Dept: ENDOCRINOLOGY | Age: 66
End: 2021-01-20
Payer: MEDICARE

## 2021-01-20 DIAGNOSIS — M81.0 OSTEOPOROSIS, UNSPECIFIED OSTEOPOROSIS TYPE, UNSPECIFIED PATHOLOGICAL FRACTURE PRESENCE: Primary | ICD-10-CM

## 2021-01-20 PROCEDURE — 96372 THER/PROPH/DIAG INJ SC/IM: CPT | Performed by: INTERNAL MEDICINE

## 2021-02-16 ENCOUNTER — TELEPHONE (OUTPATIENT)
Dept: ENDOCRINOLOGY | Age: 66
End: 2021-02-16

## 2021-02-16 PROBLEM — Z87.81 HISTORY OF COMPRESSION FRACTURE OF SPINE: Status: ACTIVE | Noted: 2021-02-16

## 2021-02-16 NOTE — TELEPHONE ENCOUNTER
[phone] ([place])      [facility] for patient and informed that Kavya Metcalf has been approved. Letter is scanned into media.

## 2021-02-17 ENCOUNTER — NURSE ONLY (OUTPATIENT)
Dept: ENDOCRINOLOGY | Age: 66
End: 2021-02-17
Payer: MEDICARE

## 2021-02-17 PROCEDURE — 96372 THER/PROPH/DIAG INJ SC/IM: CPT | Performed by: INTERNAL MEDICINE

## 2021-02-18 NOTE — PROGRESS NOTES
After obtaining consent, and per orders of Dougie Ambrocio, injection of Evenity given in Left arm, right arm by Marcelle Mcclrue. Patient instructed to remain in clinic for 20 minutes afterwards, and to report any adverse reaction to me immediately.

## 2021-03-04 ENCOUNTER — TELEPHONE (OUTPATIENT)
Dept: ENDOCRINOLOGY | Age: 66
End: 2021-03-04

## 2021-03-05 NOTE — TELEPHONE ENCOUNTER
[phone] ([place])       M for patient with message below. Informed to call  The office if any questions at [phone].

## 2021-03-05 NOTE — TELEPHONE ENCOUNTER
Please inform patient that bone density did not show significant change. I will discuss in more details during her appointment.

## 2021-03-17 ENCOUNTER — NURSE ONLY (OUTPATIENT)
Dept: ENDOCRINOLOGY | Age: 66
End: 2021-03-17

## 2021-03-17 DIAGNOSIS — M81.0 OSTEOPOROSIS, UNSPECIFIED OSTEOPOROSIS TYPE, UNSPECIFIED PATHOLOGICAL FRACTURE PRESENCE: Primary | ICD-10-CM

## 2021-03-17 RX ORDER — ROMOSOZUMAB-AQQG 105 MG/1.17ML
210 INJECTION, SOLUTION SUBCUTANEOUS ONCE
Qty: 2.34 ML | Refills: 0 | Status: CANCELLED | OUTPATIENT
Start: 2021-03-17 | End: 2021-03-17

## 2021-03-18 ENCOUNTER — TELEPHONE (OUTPATIENT)
Dept: ENDOCRINOLOGY | Age: 66
End: 2021-03-18

## 2021-03-18 RX ORDER — ROMOSOZUMAB-AQQG 105 MG/1.17ML
210 INJECTION, SOLUTION SUBCUTANEOUS ONCE
Qty: 2.34 ML | Refills: 0 | Status: SHIPPED | OUTPATIENT
Start: 2021-03-18 | End: 2021-03-18

## 2021-03-18 NOTE — TELEPHONE ENCOUNTER
Patient would like to know why [facility] got the order for evenity. She stated she has never gotten it through [facility]. She always gets it from the office.   She is confused and would like to talk to the nurse

## 2021-03-18 NOTE — TELEPHONE ENCOUNTER
Spoke with patient and [facility] and told them to disregard or fr om [facility]. Order was placed incorrectly. Patient received Evenity in the office on 3/17.

## 2021-04-07 ENCOUNTER — TELEPHONE (OUTPATIENT)
Dept: ENDOCRINOLOGY | Age: 66
End: 2021-04-07

## 2021-04-08 NOTE — TELEPHONE ENCOUNTER
[phone] (home)     Spoke with patient and she informs of new insurance and wanted to know if Dr. Gato Singer is in network. Patient was informed to call customer service number on back of card to check if provider is in network.

## 2021-04-15 ENCOUNTER — TELEPHONE (OUTPATIENT)
Dept: ENDOCRINOLOGY | Age: 66
End: 2021-04-15

## 2021-04-15 ENCOUNTER — NURSE ONLY (OUTPATIENT)
Dept: ENDOCRINOLOGY | Age: 66
End: 2021-04-15
Payer: COMMERCIAL

## 2021-04-15 VITALS — HEIGHT: 60 IN | BODY MASS INDEX: 27.34 KG/M2

## 2021-04-15 DIAGNOSIS — M81.0 OSTEOPOROSIS, UNSPECIFIED OSTEOPOROSIS TYPE, UNSPECIFIED PATHOLOGICAL FRACTURE PRESENCE: Primary | ICD-10-CM

## 2021-04-15 PROCEDURE — 96372 THER/PROPH/DIAG INJ SC/IM: CPT | Performed by: INTERNAL MEDICINE

## 2021-04-15 NOTE — TELEPHONE ENCOUNTER
Patient came into the office to get her evenity and had new insurance. I took her old out and put her new in. She stated the approval was for the other insurance. I told her she can still get the injection but she would be responsible for the bill if the new insurance was not to cover it. She said that was fine they will cover it.

## 2021-05-03 ENCOUNTER — TELEPHONE (OUTPATIENT)
Dept: ENDOCRINOLOGY | Age: 66
End: 2021-05-03

## 2021-05-03 NOTE — TELEPHONE ENCOUNTER
Please inform patient that I am a new provider for her and she needs to see me first in order to determine further actions. I will discuss that with her during upcoming appointment.

## 2021-05-04 PROBLEM — E55.9 VITAMIN D DEFICIENCY: Status: ACTIVE | Noted: 2021-05-04

## 2021-05-04 PROBLEM — Z98.84 HISTORY OF GASTRIC BYPASS: Status: ACTIVE | Noted: 2021-05-04

## 2021-05-05 ENCOUNTER — OFFICE VISIT (OUTPATIENT)
Dept: ENDOCRINOLOGY | Age: 66
End: 2021-05-05
Payer: COMMERCIAL

## 2021-05-05 VITALS
SYSTOLIC BLOOD PRESSURE: 131 MMHG | DIASTOLIC BLOOD PRESSURE: 77 MMHG | WEIGHT: 140 LBS | OXYGEN SATURATION: 96 % | HEART RATE: 80 BPM | HEIGHT: 61 IN | BODY MASS INDEX: 26.43 KG/M2

## 2021-05-05 DIAGNOSIS — Z87.81 HISTORY OF COMPRESSION FRACTURE OF SPINE: ICD-10-CM

## 2021-05-05 DIAGNOSIS — E03.9 ACQUIRED HYPOTHYROIDISM: ICD-10-CM

## 2021-05-05 DIAGNOSIS — E55.9 VITAMIN D DEFICIENCY: ICD-10-CM

## 2021-05-05 DIAGNOSIS — M81.0 AGE-RELATED OSTEOPOROSIS WITHOUT CURRENT PATHOLOGICAL FRACTURE: Primary | ICD-10-CM

## 2021-05-05 DIAGNOSIS — Z98.84 HISTORY OF GASTRIC BYPASS: ICD-10-CM

## 2021-05-05 PROCEDURE — 1090F PRES/ABSN URINE INCON ASSESS: CPT | Performed by: INTERNAL MEDICINE

## 2021-05-05 PROCEDURE — 1036F TOBACCO NON-USER: CPT | Performed by: INTERNAL MEDICINE

## 2021-05-05 PROCEDURE — 4040F PNEUMOC VAC/ADMIN/RCVD: CPT | Performed by: INTERNAL MEDICINE

## 2021-05-05 PROCEDURE — G8427 DOCREV CUR MEDS BY ELIG CLIN: HCPCS | Performed by: INTERNAL MEDICINE

## 2021-05-05 PROCEDURE — 1123F ACP DISCUSS/DSCN MKR DOCD: CPT | Performed by: INTERNAL MEDICINE

## 2021-05-05 PROCEDURE — G8417 CALC BMI ABV UP PARAM F/U: HCPCS | Performed by: INTERNAL MEDICINE

## 2021-05-05 PROCEDURE — G8399 PT W/DXA RESULTS DOCUMENT: HCPCS | Performed by: INTERNAL MEDICINE

## 2021-05-05 PROCEDURE — 3017F COLORECTAL CA SCREEN DOC REV: CPT | Performed by: INTERNAL MEDICINE

## 2021-05-05 PROCEDURE — 99215 OFFICE O/P EST HI 40 MIN: CPT | Performed by: INTERNAL MEDICINE

## 2021-05-05 NOTE — PROGRESS NOTES
SUBJECTIVE:  Paola Van is a 77 y.o. female who is being evaluated for osteoporosis. H.    1. Age-related osteoporosis without current pathological fracture  This started in 2014. Patient was diagnosed with osteoporosis. The problem has been unchanged. Patient started medication in 2014. Currently patient is on: Evenity. Misses  0 doses a month. Past medical history of anxiety, back pain, gastric bypass in 1987, status post reversal, lactose intolerance, hypothyroidism, Dumping syndrome. Can not swallow pills and does no absorb    Patient was diagnosed with osteoporosis in 2014 after she had fracture of thoracic spine  DEXA scan in November 2014 showed T score of lumbar spine -3.0 and left hip -3.8  DEXA scan in November 2015 showed T score of -3.5. At left hip and -3.0 at right hip  DEXA scan 2016 in 2018 showed bone density to be stable    History of fractures: She had a fracture of T5 and T7 after a simple fall in 2014  She had fracture of lumbar spine L2L3-L4 in 2015 after lifting her grandchild    Osteoporosis treatment: Prolia every 6 months [id number]  [facility] 212/201509/[facility]  Prolia 09/201709/2019  Evenity 04/2020    Patient had aches and pains with last Prolia injection    Family history of osteoporosis: Sister has osteoporosis  No family history of hip fracture  No early menopause, no smoking, no prolonged steroid use    Exercise: Walking    2. Acquired hypothyroidism  Hypothyroidism  On levothyroxine 0.05 mg daily  Current complaints: fatigue, dry skin    History of obstructive symptoms: difficulty swallowing No, changes in voice/hoarseness No.  History of radiation to patient's neck: No  Resent iodine exposure: No  Family history includes no thyroid abnormalities. Family history of thyroid cancer: No    3.  History of compression fracture of spine  Has back pain  History of fractures: She had a fracture of T5 and T7 after a simple fall in 2014  She had fracture of lumbar spine L2L3-L4 in 2015 after lifting her grandchild    4. History of gastric bypass  Past medical history of gastric bypass in 1987, status post reversal,    5. Vitamin D deficiency  Has fatigue     2/25/2021  1.  Osteoporosis.  The bone mineral density is compatible with osteoporosis and represents significant increased risk of fracture. 2.  The FRAX is not applicable in patients with osteoporosis.      3.  No significant change since the previous study.     Past Medical History:   Diagnosis Date    Anxiety     Chronic back pain     GERD (gastroesophageal reflux disease)     History of compression fracture of spine 2/16/2021    Irritable bowel syndrome     Post-traumatic osteoarthritis, right ankle and foot 12/27/2018    Urinary incontinence      Patient Active Problem List    Diagnosis Date Noted    History of gastric bypass 05/04/2021    Vitamin D deficiency 05/04/2021    History of compression fracture of spine 02/16/2021    Acquired hypothyroidism 02/19/2019    Secondary osteoarthritis, left ankle and foot 12/27/2018    Osteoporosis 11/03/2015     Past Surgical History:   Procedure Laterality Date    CHOLECYSTECTOMY  1985    GASTRIC BYPASS SURGERY  1987    INCONTINENCE SURGERY  2010    SMALL INTESTINE SURGERY  2000    TUBAL LIGATION      and reversed     Family History   Problem Relation Age of Onset    Arthritis Mother     Asthma Mother     Arthritis Father     Asthma Father     Stroke Father     Arthritis Sister     Asthma Sister     Cancer Sister     Arthritis Brother     Asthma Brother     Diabetes Brother     Heart Disease Brother     High Blood Pressure Brother      Social History     Socioeconomic History    Marital status:      Spouse name: None    Number of children: None    Years of education: None    Highest education level: None   Occupational History    None   Social Needs    Financial resource strain: None    Food insecurity     Worry: None     Inability: None    Transportation needs     Medical: None     Non-medical: None   Tobacco Use    Smoking status: Never Smoker    Smokeless tobacco: Never Used   Substance and Sexual Activity    Alcohol use: No    Drug use: No    Sexual activity: Never   Lifestyle    Physical activity     Days per week: None     Minutes per session: None    Stress: None   Relationships    Social connections     Talks on phone: None     Gets together: None     Attends Jewish service: None     Active member of club or organization: None     Attends meetings of clubs or organizations: None     Relationship status: None    Intimate partner violence     Fear of current or ex partner: None     Emotionally abused: None     Physically abused: None     Forced sexual activity: None   Other Topics Concern    None   Social History Narrative    None     Current Outpatient Medications   Medication Sig Dispense Refill    levothyroxine (SYNTHROID) 50 MCG tablet Take 75 mcg by mouth Daily       fluticasone (FLONASE) 50 MCG/ACT nasal spray 1 spray by Each Nare route daily      ALPRAZolam (XANAX) 0.5 MG tablet Take 0.5 mg by mouth nightly as needed for Sleep      busPIRone (BUSPAR) 15 MG tablet Take 15 mg by mouth daily Take one half tablet in the AM and one half tablet in the PM      Calcium Citrate-Vitamin D (CITRACAL MAXIMUM PO) Take by mouth Calcium 800 mg , Vitamin [id number] IU  Takes four daily      Ascorbic Acid (VITAMIN C) 500 MG tablet Take 500 mg by mouth daily      Cranberry 500 MG CHEW Take by mouth      Multiple Vitamins-Minerals (THERAPEUTIC MULTIVITAMIN-MINERALS) tablet Take 2 tablets by mouth daily      Fiber, Guar Gum, CHEW Take by mouth      cetirizine (ZYRTEC) 10 MG tablet Take 10 mg by mouth daily      NONFORMULARY Place 125 mg under the tongue daily Hemp oil - sublingual (2-3 drops once daily)       No current facility-administered medications for this visit.       Allergies   Allergen Reactions    Codeine Anaphylaxis, Nausea Only, Other (See Comments) and Rash     Chest tightness    Ciprofloxacin Rash     Family Status   Relation Name Status    Mother      Father     Dee Lee Sister  (Not Specified)    Brother  (Not Specified)       Review of Systems:  Constitutional: has fatigue, no fever, no recent weight gain, no recent weight loss, no changes in appetite  Eyes: no eye pain, no change in vision, no eye redness, no eye irritation, no double vision  Ears, nose, throat: no nasal congestion, no sore throat, no earache, no decrease in hearing, no hoarseness, no dry mouth, no sinus problems, no difficulty swallowing, no neck lumps, no dental problems, no mouth sores, no ringing in ears  Pulmonary: no shortness of breath, no wheezing, no dyspnea on exertion, no cough  Cardiovascular: no chest pain, no lower extremity edema, no orthopnea, no intermittent leg claudication, no palpitations  Gastrointestinal: no abdominal pain, no nausea, no vomiting, has diarrhea, has constipation, no dysphagia, has heartburn, has bloating  Genitourinary: no dysuria, no urinary incontinence, no urinary hesitancy, no urinary frequency, no feelings of urinary urgency, no nocturia  Musculoskeletal: has joint swelling, has joint stiffness, has joint pain, no muscle cramps, no muscle pain,   Integument/Breast: no hair loss, no skin rashes, no skin lesions, no itching, has dry skin  Neurological: no numbness, no tingling, no weakness, no confusion, no headaches, no dizziness, no fainting, no tremors, no decrease in memory, no balance problems  Psychiatric: no anxiety, no depression, no insomnia  Hematologic/Lymphatic: no tendency for easy bleeding, no swollen lymph nodes, no tendency for easy bruising  Immunology: has seasonal allergies, no frequent infections, no frequent illnesses  Endocrine: no temperature intolerance    /77   Pulse 80   Ht 5' 0.91\" (1.547 m)   Wt 140 lb (63.5 kg)   SpO2 96%   BMI 26.53 kg/m²    Wt Readings from Last 3 Encounters:   05/05/21 140 lb (63.5 kg)   11/05/20 140 lb (63.5 kg)   10/15/20 142 lb 9.6 oz (64.7 kg)     Body mass index is 26.53 kg/m².     OBJECTIVE:  Constitutional: no acute distress, well appearing and well nourished  Psychiatric: oriented to person, place and time, judgement and insight and normal, recent and remote memory and intact and mood and affect are normal  Skin: skin and subcutaneous tissue is normal without mass, normal turgor  Head and Face: examination of head and face revealed no abnormalities  Eyes: no lid or conjunctival swelling, erythema or discharge, pupils are normal, equal, round, reactive to light  Ears/Nose: external inspection of ears and nose revealed no abnormalities, hearing is grossly normal  Oropharynx/Mouth/Face: lips, tongue and gums are normal with no lesions, the voice quality was normal  Neck: neck is supple and symmetric, with midline trachea and no masses, thyroid is normal  Lymphatics: normal cervical lymph nodes, normal supraclavicular nodes  Pulmonary: no increased work of breathing or signs of respiratory distress, lungs are clear to auscultation  Cardiovascular: normal heart rate and rhythm, normal S1 and S2, no murmurs and pedal pulses and 2+ bilaterally, No edema  Abdomen: abdomen is soft, non-tender with no masses  Musculoskeletal: normal gait and station and exam of the digits and nails are normal  Neurological: normal coordination and normal general cortical function      Lab Review:    No results found for: WBC, HGB, HCT, MCV, PLT  Lab Results   Component Value Date     09/16/2019    K 5.0 09/16/2019     09/16/2019    CO2 28 09/16/2019    BUN 18 09/16/2019    GLUCOSE 117 09/16/2019    CALCIUM 9.1 09/16/2019    LABALBU 7.0 09/16/2019    LABALBU 3.7 09/16/2019    BILITOT 0.5 09/16/2019    ALKPHOS 52 09/16/2019    AST 21 09/16/2019    ALT 13 09/16/2019    LABGLOM > 60 09/16/2019    GFRAA > 60 09/16/2019    AGRATIO 1.1 09/16/2019    GLOB 3.3 09/16/2019 Lab Results   Component Value Date    TSH 1.567 09/16/2019    FT3 2.2 09/16/2019     No results found for: LABA1C  No results found for: EAG  No results found for: CHOL  No results found for: TRIG  No results found for: HDL  No results found for: LDLCHOLESTEROL, LDLCALC  No results found for: LABVLDL, VLDL  No results found for: CHOLHDLRATIO  No results found for: LABMICR, [id number]  Lab Results   Component Value Date    VITD25 77.8 09/16/2019        ASSESSMENT/PLAN:    1. Age-related osteoporosis without current pathological fracture  Proceed with Prolia  Calcium in food, vitamin D  - Vitamin D 25 Hydroxy; Future  CMP  DEXA  Compression fractures of thoracic spine in 2014 and lumbar spine in 2015. DEXA scan in 2014 and 2015 showed osteoporosis  Has history of gastric bypass in the past  Normal vitamin D level, PTH level, calcium level, 24-hour urine calcium level    On Forteo 12/20159/2017  Stopped as well as having joint swelling  Had Prolia 9/20179/2018  DEXA scan in January 2018 showed osteoporosis with T score of -3.5 in the lumbar spine and -3.3 in right hip  Repeat DEXA scan in January 2019 showed T score 0.2 in lumbar spine, improvement in right and left hip  Was on Prolia  Last injection of Prolia was 9/2019 2020 DEXA scan showed stable bone density  On Evenity since 4/2020  She experienced more aches and pains  Patient will start treatment with Prolia, now had 12 months of Evenity    2. Acquired hypothyroidism    TSH 1.675 in 8/2020  Continue levothyroxine 0.05 mg daily  - T4, Free; Future  - TSH without Reflex; Future  - Comprehensive Metabolic Panel; Future    3. History of compression fracture of spine  Compression fracture of her lumbar spine, status post kyphoplasty  Followed by Dr. Nik Campo at [address] [place] [address]  Continue monitoring  Treat osteoporosis    4. History of gastric bypass  Continue monitoring vitamin D    5.  Vitamin D deficiency    25 hydroxy vitamin D 85  Last appointment was advised to take calcium 600 mg daily, discontinue vitamin D  Continue vitamin D supplement  - T4, Free; Future  - TSH without Reflex; Future  - Comprehensive Metabolic Panel; Future  - Vitamin D 25 Hydroxy; Future     Reviewed and/or ordered clinical lab results Yes  Reviewed and/or ordered radiology tests Yes   Reviewed and/or ordered other diagnostic tests No  Discussed test results with performing physician No  Independently reviewed image, tracing, or specimen No  Made a decision to obtain old records No  Reviewed and summarized old records Yes   TSH 1.675  Calcium 8.9  25 hydroxy vitamin D 74.8  Obtained history from other than patient No    Charlett Amanda was counseled regarding symptoms of osteoporosis diagnosis, course and complications of disease if inadequately treated, side effects of medications, diagnosis, treatment options, and prognosis, risks, benefits, complications, and alternatives of treatment, labs, imaging and other studies and treatment targets and goals. She understands instructions and counseling. Total time I spent for this encounter 40 minutes  Previously Dr. George Boo patient  New patient for me  I reviewed all medical records, imaging, lab results, notes in details  Return in about 6 months (around 11/5/2021) for osteoporosis.     Electronically signed by Dylan Courtney MD on 5/9/2021 at 8:07 PM

## 2021-05-10 ENCOUNTER — TELEPHONE (OUTPATIENT)
Dept: ENDOCRINOLOGY | Age: 66
End: 2021-05-10

## 2021-05-13 DIAGNOSIS — M81.0 AGE-RELATED OSTEOPOROSIS WITHOUT CURRENT PATHOLOGICAL FRACTURE: Primary | ICD-10-CM

## 2021-05-19 ENCOUNTER — NURSE ONLY (OUTPATIENT)
Dept: ENDOCRINOLOGY | Age: 66
End: 2021-05-19
Payer: COMMERCIAL

## 2021-05-19 PROCEDURE — 96372 THER/PROPH/DIAG INJ SC/IM: CPT | Performed by: INTERNAL MEDICINE

## 2021-05-19 NOTE — PROGRESS NOTES
After obtaining consent, and per orders of Dr. Héctor Hardy, injection of Prolia given in Left arm by Ignacio Bagley MA. Patient instructed to remain in clinic for 20 minutes afterwards, and to report any adverse reaction to me immediately.

## 2021-10-14 ENCOUNTER — TELEPHONE (OUTPATIENT)
Dept: ENDOCRINOLOGY | Age: 66
End: 2021-10-14

## 2021-10-14 NOTE — TELEPHONE ENCOUNTER
[employer] Benefits Verification Request came in, Policy set to terminate on 09/30/2021.     Please see attached request.

## 2021-10-18 NOTE — TELEPHONE ENCOUNTER
Pt contacted New Insurance:    Elyse Doll 89    Member ID [id number]    Group # [id number] ([id number]) [facility]         Medicaid ID:  Billing # [id number]

## 2021-10-18 NOTE — TELEPHONE ENCOUNTER
Insurance was terminated last month. Please update current insurance and resubmit to [employer]. Thanks!

## 2021-11-16 ENCOUNTER — TELEPHONE (OUTPATIENT)
Dept: ENDOCRINOLOGY | Age: 66
End: 2021-11-16

## 2021-11-16 NOTE — TELEPHONE ENCOUNTER
PT called to give new insurance. BCBS and Medicaid. Please make sure her Prolia is approved with her New insurance.  Her appt is 11/24/21 at [facility] office

## 2021-11-23 NOTE — TELEPHONE ENCOUNTER
AM SOB for updated insurance in. Please do urgent PA. Pt scheduled for tmrw at [facility], [place]. Please verify (: thanks!

## 2021-11-24 ENCOUNTER — NURSE ONLY (OUTPATIENT)
Dept: ENDOCRINOLOGY | Age: 66
End: 2021-11-24
Payer: COMMERCIAL

## 2021-11-24 DIAGNOSIS — M81.0 AGE-RELATED OSTEOPOROSIS WITHOUT CURRENT PATHOLOGICAL FRACTURE: ICD-10-CM

## 2021-11-24 PROCEDURE — 96372 THER/PROPH/DIAG INJ SC/IM: CPT | Performed by: INTERNAL MEDICINE

## 2022-03-09 ENCOUNTER — TELEPHONE (OUTPATIENT)
Dept: ENDOCRINOLOGY | Age: 67
End: 2022-03-09

## 2022-03-09 NOTE — TELEPHONE ENCOUNTER
Patient has lab and DEXA results ready for review from 3/1. For some reason they are not attached to Dr Calli Garcia name. Patient would like results.

## 2022-03-10 NOTE — TELEPHONE ENCOUNTER
Direct comparison of DEXA scan is not possible, because it was done on different DEXA machine this time, comparison is accurate only if done on the same machine. Compared with 2020, bone density improved in radius, and left and right total hip. I will discuss in more details during appointment. No critical labs. I will discuss results during appointment. Patient can consider scheduling sooner appointment in cancellation if she wants to discuss with me results sooner.

## 2022-05-31 ENCOUNTER — NURSE ONLY (OUTPATIENT)
Dept: ENDOCRINOLOGY | Age: 67
End: 2022-05-31
Payer: MEDICARE

## 2022-05-31 DIAGNOSIS — M81.0 AGE-RELATED OSTEOPOROSIS WITHOUT CURRENT PATHOLOGICAL FRACTURE: ICD-10-CM

## 2022-05-31 PROCEDURE — 96372 THER/PROPH/DIAG INJ SC/IM: CPT | Performed by: INTERNAL MEDICINE

## 2022-05-31 NOTE — PROGRESS NOTES
Informed patient if any signs of redness,rash,swelling or unusual symptoms occur, please contact the office. Prolia given per physician order. Office supplied.     subq given to BERTIN

## 2022-06-16 ENCOUNTER — OFFICE VISIT (OUTPATIENT)
Dept: ENDOCRINOLOGY | Age: 67
End: 2022-06-16
Payer: MEDICARE

## 2022-06-16 VITALS
WEIGHT: 145 LBS | TEMPERATURE: 98 F | DIASTOLIC BLOOD PRESSURE: 76 MMHG | HEIGHT: 60 IN | HEART RATE: 80 BPM | BODY MASS INDEX: 28.47 KG/M2 | OXYGEN SATURATION: 99 % | SYSTOLIC BLOOD PRESSURE: 122 MMHG | RESPIRATION RATE: 14 BRPM

## 2022-06-16 DIAGNOSIS — M81.0 AGE-RELATED OSTEOPOROSIS WITHOUT CURRENT PATHOLOGICAL FRACTURE: Primary | ICD-10-CM

## 2022-06-16 DIAGNOSIS — E55.9 VITAMIN D DEFICIENCY: ICD-10-CM

## 2022-06-16 DIAGNOSIS — Z87.81 HISTORY OF COMPRESSION FRACTURE OF SPINE: ICD-10-CM

## 2022-06-16 DIAGNOSIS — E03.9 ACQUIRED HYPOTHYROIDISM: ICD-10-CM

## 2022-06-16 DIAGNOSIS — Z98.84 HISTORY OF GASTRIC BYPASS: ICD-10-CM

## 2022-06-16 PROCEDURE — 99214 OFFICE O/P EST MOD 30 MIN: CPT | Performed by: INTERNAL MEDICINE

## 2022-06-16 PROCEDURE — 1123F ACP DISCUSS/DSCN MKR DOCD: CPT | Performed by: INTERNAL MEDICINE

## 2022-06-16 NOTE — PROGRESS NOTES
SUBJECTIVE:  Vanesa Ricardo is a 74 y.o. female who is being evaluated for osteoporosis. H.    1. Age-related osteoporosis without current pathological fracture  This started in 2014. Patient was diagnosed with osteoporosis. The problem has been unchanged. Patient started medication in 2014. Currently patient is on: Evenity. Misses  0 doses a month. Past medical history of anxiety, back pain, gastric bypass in 1987, status post reversal, lactose intolerance, hypothyroidism, Dumping syndrome. Can not swallow pills and does no absorb    Patient was diagnosed with osteoporosis in 2014 after she had fracture of thoracic spine  DEXA scan in November 2014 showed T score of lumbar spine -3.0 and left hip -3.8  DEXA scan in November 2015 showed T score of -3.5. At left hip and -3.0 at right hip  DEXA scan 2016 in 2018 showed bone density to be stable    History of fractures: She had a fracture of T5 and T7 after a simple fall in 2014  She had fracture of lumbar spine L2-L3-L4 in 2015 after lifting her grandchild    Osteoporosis treatment: Prolia every 6 months [id number]  Forteo 212/2015-09/2017  Prolia 09/2017-09/2019  Evenity 04/2020    Patient had aches and pains with last Prolia injection    Family history of osteoporosis: Sister has osteoporosis  No family history of hip fracture  No early menopause, no smoking, no prolonged steroid use    Exercise: Walking    2. Acquired hypothyroidism  Hypothyroidism  On levothyroxine 0.05 mg daily  Current complaints: fatigue, dry skin    History of obstructive symptoms: difficulty swallowing No, changes in voice/hoarseness No.  History of radiation to patient's neck: No  Resent iodine exposure: No  Family history includes no thyroid abnormalities. Family history of thyroid cancer: No    3.  History of compression fracture of spine  Has back pain  History of fractures: She had a fracture of T5 and T7 after a simple fall in 2014  She had fracture of lumbar spine L2-L3-L4 in 2015 after lifting her grandchild    4. History of gastric bypass  Past medical history of gastric bypass in 1987, status post reversal,    5. Vitamin D deficiency  Has fatigue     2/25/2021  1.  Osteoporosis.  The bone mineral density is compatible with osteoporosis and represents significant increased risk of fracture. 2.  The FRAX is not applicable in patients with osteoporosis.      3.  No significant change since the previous study. EXAMINATION: BD-BONE DENSITY DEXA AXIAL SKELETON        DATE OF EXAM:  3/1/2022 8:27 AM       DEMOGRAPHICS: 74years old Female        INDICATION:     M81.0: Age-related osteoporosis without current    pathological fracture     History:  Screening. Number of Series/Images:     3.        COMPARISON: No existing relevant imaging study corresponding to the same    anatomical region is available.       TECHNIQUE :    Bone mineral analysis was performed of the lumbar spine and both hips    utilizing the DXA method.       Modality Information:   : [place]   Model: [employer] W ([id number])   Location: PC       Computer-generated images of these regions were satisfactory for    interpretation.       FINDINGS:   Right forearm:    Region: Radius 33%    *  BMD: 0.561 g/cm2   *  T-score: -2.2   *  Z-score: 0.3       Left Hip:    Region: Left femoral neck   *  BMD: 0.530 g/cm2   *  T-score: -2.9   *  Z-score: -1.2       Region: Total left hip   *  BMD: 0.601 g/cm2   *  T-score: -2.8   *  Z-score: -1.4       Right Hip:    Region: Right femoral neck   *  BMD: 0.454 g/cm2   *  T-score: -3.6   *  Z-score: -1.9       Region:  Total right hip   *  BMD: 0.740 g/cm2   *  T-score: -1.7   *  Z-score: -0.3           T score interpretation according to the National Osteoporosis Foundation:       T-score at or above  -1.0  Normal       T-score  between  -1.0  and  -2.5  osteopenia       T-score at or below  -2.5   osteoporosis       [IMPRESSION]   1.  Bone mineral density compatible with osteoporosis, placing the patient    at increased risk for fracture.        FRAX risk assessment :    The estimated 10 year probability/risk of fracture (percent) with BMD for    a:   *  Major Osteoporotic fracture : 28%    *  Hip fracture: 12%       FRAX is a computer-based algorithm which uses clinical risk factors and    DXA to estimate a person's 10 year fracture probability.       Consider therapy if the FRAX score is:   1.   20% or greater for a major osteoporotic fracture and/or   2.   3% or greater for the hip.       FRAX may underestimate the risk for fracture in patients who experience    falls, have multiple fractures and whose degree of glucocorticoid    exposure, cigarette use and alcohol use is greater.        The model accepts ages between 36 and 80 years.  If ages below or above are    entered, the program will compute probabilities at 36 and 80 year,    respectively.                     This dictation was created with voice recognition software.  While    attempts have been made to review the dictation as it is transcribed, on    occasion the spoken word can be misinterpreted by the technology leading    to omissions or inappropriate words, phrases or sentences.               Electronically Signed by: Sam Metz DO, 3/1/2022 1:30 PM               Past Medical History:   Diagnosis Date    Anxiety     Chronic back pain     GERD (gastroesophageal reflux disease)     History of compression fracture of spine 2/16/2021    Irritable bowel syndrome     Post-traumatic osteoarthritis, right ankle and foot 12/27/2018    Urinary incontinence      Patient Active Problem List    Diagnosis Date Noted    History of gastric bypass 05/04/2021    Vitamin D deficiency 05/04/2021    History of compression fracture of spine 02/16/2021    Acquired hypothyroidism 02/19/2019    Secondary osteoarthritis, left ankle and foot 12/27/2018    Osteoporosis 11/03/2015     Past Surgical History:   Procedure Laterality Date    CHOLECYSTECTOMY  1985    GASTRIC BYPASS SURGERY  1987    INCONTINENCE SURGERY  2010    SMALL INTESTINE SURGERY  2000    TUBAL LIGATION      and reversed     Family History   Problem Relation Age of Onset    Arthritis Mother     Asthma Mother     Arthritis Father     Asthma Father     Stroke Father     Arthritis Sister     Asthma Sister     Cancer Sister     Arthritis Brother     Asthma Brother     Diabetes Brother     Heart Disease Brother     High Blood Pressure Brother      Social History     Socioeconomic History    Marital status:      Spouse name: None    Number of children: None    Years of education: None    Highest education level: None   Occupational History    None   Tobacco Use    Smoking status: Never Smoker    Smokeless tobacco: Never Used   Vaping Use    Vaping Use: Never used   Substance and Sexual Activity    Alcohol use: No    Drug use: No    Sexual activity: Never   Other Topics Concern    None   Social History Narrative    None     Social Determinants of Health     Financial Resource Strain:     Difficulty of Paying Living Expenses: Not on file   Food Insecurity:     Worried About Running Out of Food in the Last Year: Not on file    Darin of Food in the Last Year: Not on file   Transportation Needs:     Lack of Transportation (Medical): Not on file    Lack of Transportation (Non-Medical):  Not on file   Physical Activity:     Days of Exercise per Week: Not on file    Minutes of Exercise per Session: Not on file   Stress:     Feeling of Stress : Not on file   Social Connections:     Frequency of Communication with Friends and Family: Not on file    Frequency of Social Gatherings with Friends and Family: Not on file    Attends Sabianism Services: Not on file    Active Member of Clubs or Organizations: Not on file    Attends Club or Organization Meetings: Not on file    Marital Status: Not on file   Intimate Partner Violence: weight gain, no recent weight loss, no changes in appetite  Eyes: no eye pain, no change in vision, no eye redness, no eye irritation, no double vision  Ears, nose, throat: no nasal congestion, no sore throat, no earache, no decrease in hearing, no hoarseness, no dry mouth, no sinus problems, no difficulty swallowing, no neck lumps, no dental problems, no mouth sores, no ringing in ears  Pulmonary: no shortness of breath, no wheezing, no dyspnea on exertion, no cough  Cardiovascular: no chest pain, no lower extremity edema, no orthopnea, no intermittent leg claudication, no palpitations  Gastrointestinal: no abdominal pain, no nausea, no vomiting, has diarrhea, has constipation, no dysphagia, has heartburn, has bloating  Genitourinary: no dysuria, no urinary incontinence, no urinary hesitancy, no urinary frequency, no feelings of urinary urgency, no nocturia  Musculoskeletal: has joint swelling, has joint stiffness, has joint pain, no muscle cramps, no muscle pain,   Integument/Breast: no hair loss, no skin rashes, no skin lesions, no itching, has dry skin  Neurological: no numbness, no tingling, no weakness, no confusion, no headaches, no dizziness, no fainting, no tremors, no decrease in memory, no balance problems  Psychiatric: no anxiety, no depression, no insomnia  Hematologic/Lymphatic: no tendency for easy bleeding, no swollen lymph nodes, no tendency for easy bruising  Immunology: has seasonal allergies, no frequent infections, no frequent illnesses  Endocrine: no temperature intolerance    /76   Pulse 80   Temp 98 °F (36.7 °C)   Resp 14   Ht 5' (1.524 m)   Wt 145 lb (65.8 kg)   SpO2 99%   BMI 28.32 kg/m²    Wt Readings from Last 3 Encounters:   06/16/22 145 lb (65.8 kg)   05/05/21 140 lb (63.5 kg)   11/05/20 140 lb (63.5 kg)     Body mass index is 28.32 kg/m².     OBJECTIVE:  Constitutional: no acute distress, well appearing and well nourished  Psychiatric: oriented to person, place and time, judgement and insight and normal, recent and remote memory and intact and mood and affect are normal  Skin: skin and subcutaneous tissue is normal without mass, normal turgor  Head and Face: examination of head and face revealed no abnormalities  Eyes: no lid or conjunctival swelling, erythema or discharge, pupils are normal, equal, round, reactive to light  Ears/Nose: external inspection of ears and nose revealed no abnormalities, hearing is grossly normal  Oropharynx/Mouth/Face: lips, tongue and gums are normal with no lesions, the voice quality was normal  Neck: neck is supple and symmetric, with midline trachea and no masses, thyroid is normal  Lymphatics: normal cervical lymph nodes, normal supraclavicular nodes  Pulmonary: no increased work of breathing or signs of respiratory distress, lungs are clear to auscultation  Cardiovascular: normal heart rate and rhythm, normal S1 and S2, no murmurs and pedal pulses and 2+ bilaterally, No edema  Abdomen: abdomen is soft, non-tender with no masses  Musculoskeletal: normal gait and station and exam of the digits and nails are normal  Neurological: normal coordination and normal general cortical function      Lab Review:    Lab Results   Component Value Date    MCV 87.3 03/01/2022     Lab Results   Component Value Date     03/01/2022    K 5.0 03/01/2022     03/01/2022    CO2 27 03/01/2022    BUN 16 03/01/2022    GLUCOSE 119 03/01/2022    CALCIUM 9.3 03/01/2022    LABALBU 4.0 03/01/2022    BILITOT 0.4 03/01/2022    BILITOT 0.5 09/16/2019    ALKPHOS 48 03/01/2022    AST 19 03/01/2022    ALT 11 03/01/2022    LABGLOM > 60 03/01/2022    LABGLOM > 60 03/01/2022    LABGLOM > 60 09/16/2019    GFRAA > 60 09/16/2019    AGRATIO 1.3 03/01/2022    GLOB 3.0 03/01/2022     Lab Results   Component Value Date    TSH 1.420 03/01/2022    TSH 1.567 09/16/2019    FT3 2.2 09/16/2019     Lab Results   Component Value Date    LABA1C 5.6 03/01/2022     No results found for: EAG  No results found for: CHOL  Lab Results   Component Value Date    TRIG 57 03/01/2022     Lab Results   Component Value Date    HDL 73 03/01/2022     Lab Results   Component Value Date    LDLCALC 89 03/01/2022     Lab Results   Component Value Date    LABVLDL 11 03/01/2022     No results found for: CHOLHDLRATIO  No results found for: Rachael Jefferson Results   Component Value Date    VITD25 54.6 03/01/2022        ASSESSMENT/PLAN:    1. Age-related osteoporosis without current pathological fracture  12/2021 Prolia resumed again  -Calcium in food, vitamin D  - Vitamin D 25 Hydroxy; Future  -CMP  -DEXA  Compression fractures of thoracic spine in 2014 and lumbar spine in 2015. DEXA scan in 2014 and 2015 showed osteoporosis  Has history of gastric bypass in the past  Normal vitamin D level, PTH level, calcium level, 24-hour urine calcium level    On Forteo 12/2015-9/2017  Stopped as well as having joint swelling  Had Prolia 9/2017-9/2018  DEXA scan in January 2018 showed osteoporosis with T score of -3.5 in the lumbar spine and -3.3 in right hip  Repeat DEXA scan in January 2019 showed T score 0.2 in lumbar spine, improvement in right and left hip  Was on Prolia  Last injection of Prolia was 9/2019 2020 DEXA scan showed stable bone density  On Evenity since 4/2020  She experienced more aches and pains  Patient will start treatment with Prolia, now had 12 months of Evenity, finished 5/2021 2022 DEXA scan showed bone density: forearm -2.2, left femoral neck -2.9, left total hip -2.09, right femoral neck -3.6, total right hip -1.7    2. Acquired hypothyroidism  TSH 1.42  TSH 1.675 in 8/2020  Continue levothyroxine 0.05 mg daily  - T4, Free; Future  - TSH without Reflex; Future  - Comprehensive Metabolic Panel; Future    3. History of compression fracture of spine  Compression fracture of her lumbar spine, status post kyphoplasty  Follow[facility] Celena [facility]  Continue monitoring  Treat osteoporosis    4. History of gastric bypass  Continue monitoring vitamin D  Hemoglobin A1c 5.6    5. Vitamin D deficiency  Calcium 9.3  25 hydroxy vitamin D 85-54.6  Calcium 600 mg daily with vitamin D  Caltrate 250 mg 2 tablets daily, decrease with vitamin D  - T4, Free; Future  - TSH without Reflex; Future  - Comprehensive Metabolic Panel; Future  - Vitamin D 25 Hydroxy; Future     Reviewed and/or ordered clinical lab results Yes  Reviewed and/or ordered radiology tests Yes   Reviewed and/or ordered other diagnostic tests No  Discussed test results with performing physician No  Independently reviewed image, tracing, or specimen No  Made a decision to obtain old records No  Reviewed and summarized old records Yes   TSH 1.675  Calcium 8.9  25 hydroxy vitamin D 74.8  Obtained history from other than patient No    Louise Wilson was counseled regarding symptoms of osteoporosis diagnosis, course and complications of disease if inadequately treated, side effects of medications, diagnosis, treatment options, and prognosis, risks, benefits, complications, and alternatives of treatment, labs, imaging and other studies and treatment targets and goals. She understands instructions and counseling. Total time I spent for this encounter 30 minutes    Return in about 6 months (around 12/16/2022) for osteoporosis.     Electronically signed by Hany Davey MD on 6/16/2022 at 3:33 PM

## 2022-12-02 LAB
A/G RATIO: 1.3 (ref 1–2)
ALBUMIN SERPL-MCNC: 4.2 G/DL (ref 3.5–5.7)
ALBUMIN/PROTEIN TOTAL, SER/PL: 7.4 G/DL (ref 6–8.3)
ALP BLD-CCNC: 45 U/L (ref 34–104)
ALT SERPL-CCNC: 10 U/L (ref 7–52)
ANION GAP 4: 8 MMOL/L (ref 7–16)
AST W/O P-5'-P: 19 U/L (ref 13–39)
BILIRUB SERPL-MCNC: 0.7 MG/DL (ref 0.3–1)
BUN BLDV-MCNC: 15 MG/DL (ref 7–25)
CALCIUM SERPL-MCNC: 9.6 MG/DL (ref 8.6–10.2)
CHLORIDE BLD-SCNC: 102 MMOL/L (ref 98–107)
CO2: 27 MMOL/L (ref 21–31)
CREATININE + EGFR PANEL: 0.84 MG/DL (ref 0.6–1.2)
EGFR FEMALE: 76 ML/MIN/1.73M2
GLOBULIN: 3.2 G/DL (ref 2.6–4.2)
GLUCOSE: 105 MG/DL (ref 74–109)
POTASSIUM SERPL-SCNC: 4.5 MMOL/L (ref 3.5–5.1)
SODIUM BLD-SCNC: 137 MMOL/L (ref 136–145)
VITAMIN D 25-HYDROXY: 46.9 NG/ML

## 2022-12-05 ENCOUNTER — OFFICE VISIT (OUTPATIENT)
Dept: ENDOCRINOLOGY | Age: 67
End: 2022-12-05
Payer: MEDICARE

## 2022-12-05 ENCOUNTER — TELEPHONE (OUTPATIENT)
Dept: ENDOCRINOLOGY | Age: 67
End: 2022-12-05

## 2022-12-05 VITALS
TEMPERATURE: 98 F | SYSTOLIC BLOOD PRESSURE: 136 MMHG | HEART RATE: 83 BPM | HEIGHT: 60 IN | WEIGHT: 142.6 LBS | OXYGEN SATURATION: 97 % | BODY MASS INDEX: 28 KG/M2 | DIASTOLIC BLOOD PRESSURE: 78 MMHG | RESPIRATION RATE: 14 BRPM

## 2022-12-05 DIAGNOSIS — E03.9 ACQUIRED HYPOTHYROIDISM: ICD-10-CM

## 2022-12-05 DIAGNOSIS — M81.0 AGE-RELATED OSTEOPOROSIS WITHOUT CURRENT PATHOLOGICAL FRACTURE: Primary | ICD-10-CM

## 2022-12-05 DIAGNOSIS — Z87.81 HISTORY OF COMPRESSION FRACTURE OF SPINE: ICD-10-CM

## 2022-12-05 DIAGNOSIS — Z98.84 HISTORY OF GASTRIC BYPASS: ICD-10-CM

## 2022-12-05 DIAGNOSIS — E55.9 VITAMIN D DEFICIENCY: ICD-10-CM

## 2022-12-05 PROCEDURE — 1123F ACP DISCUSS/DSCN MKR DOCD: CPT | Performed by: INTERNAL MEDICINE

## 2022-12-05 PROCEDURE — 99214 OFFICE O/P EST MOD 30 MIN: CPT | Performed by: INTERNAL MEDICINE

## 2022-12-05 PROCEDURE — 96372 THER/PROPH/DIAG INJ SC/IM: CPT | Performed by: INTERNAL MEDICINE

## 2022-12-05 NOTE — PROGRESS NOTES
Informed patient if any signs of redness,rash,swelling or unusual symptoms occur, please contact the office. Prolia given per physician order.     Office supplied, subq to BERTIN

## 2022-12-05 NOTE — PROGRESS NOTES
SUBJECTIVE:  Bennie Craft is a 74 y.o. female who is being evaluated for osteoporosis. H.    1. Age-related osteoporosis without current pathological fracture  This started in 2014. Patient was diagnosed with osteoporosis. The problem has been unchanged. Patient started medication in 2014. Currently patient is on: Evenity. Misses  0 doses a month. Past medical history of anxiety, back pain, gastric bypass in 1987, status post reversal, lactose intolerance, hypothyroidism, Dumping syndrome. Can not swallow pills and does no absorb    Patient was diagnosed with osteoporosis in 2014 after she had fracture of thoracic spine  DEXA scan in November 2014 showed T score of lumbar spine -3.0 and left hip -3.8  DEXA scan in November 2015 showed T score of -3.5. At left hip and -3.0 at right hip  DEXA scan 2016 in 2018 showed bone density to be stable    History of fractures: She had a fracture of T5 and T7 after a simple fall in 2014  She had fracture of lumbar spine L2-L3-L4 in 2015 after lifting her grandchild    Osteoporosis treatment: Prolia every 6 months [id number]  Forteo 212/2015-09/2017  Prolia 09/2017-09/2019  Evenity 04/2020    Patient had aches and pains with last Prolia injection    Family history of osteoporosis: Sister has osteoporosis  No family history of hip fracture  No early menopause, no smoking, no prolonged steroid use    Exercise: Walking    2. Acquired hypothyroidism  Hypothyroidism  On levothyroxine 0.05 mg daily  Current complaints: fatigue, dry skin    History of obstructive symptoms: difficulty swallowing No, changes in voice/hoarseness No.  History of radiation to patient's neck: No  Resent iodine exposure: No  Family history includes no thyroid abnormalities. Family history of thyroid cancer: No    3.  History of compression fracture of spine  Has back pain  History of fractures: She had a fracture of T5 and T7 after a simple fall in 2014  She had fracture of lumbar spine L2-L3-L4 in 2015 after lifting her grandchild    4. History of gastric bypass  Past medical history of gastric bypass in 1987, status post reversal,    5. Vitamin D deficiency  Has fatigue     2/25/2021  1. Osteoporosis. The bone mineral density is compatible with osteoporosis and represents significant increased risk of fracture. 2.  The FRAX is not applicable in patients with osteoporosis. 3.  No significant change since the previous study. EXAMINATION: BD-BONE DENSITY DEXA AXIAL SKELETON        DATE OF EXAM:  3/1/2022 8:27 AM       DEMOGRAPHICS: 74years old Female        INDICATION:     M81.0: Age-related osteoporosis without current    pathological fracture     History:  Screening. Number of Series/Images:     3.        COMPARISON: No existing relevant imaging study corresponding to the same    anatomical region is available. TECHNIQUE :    Bone mineral analysis was performed of the lumbar spine and both hips    utilizing the DXA method. Modality Information:   : [employer]   Model: [employer] Software ([id number])   Location: PC       Computer-generated images of these regions were satisfactory for    interpretation. FINDINGS:   Right forearm:    Region: Radius 33%    *  BMD: 0.561 g/cm2   *  T-score: -2.2   *  Z-score: 0.3       Left Hip:    Region: Left femoral neck   *  BMD: 0.530 g/cm2   *  T-score: -2.9   *  Z-score: -1.2       Region: Total left hip   *  BMD: 0.601 g/cm2   *  T-score: -2.8   *  Z-score: -1.4       Right Hip:    Region: Right femoral neck   *  BMD: 0.454 g/cm2   *  T-score: -3.6   *  Z-score: -1.9       Region: Total right hip   *  BMD: 0.740 g/cm2   *  T-score: -1.7   *  Z-score: -0.3           T score interpretation according to the [address] [place] [address]:       T-score at or above  -1.0  Normal       T-score  between  -1.0  and  -2.5  osteopenia       T-score at or below  -2.5   osteoporosis       [IMPRESSION]   1.   Bone mineral density compatible with osteoporosis, placing the patient    at increased risk for fracture. FRAX risk assessment :    The estimated 10 year probability/risk of fracture (percent) with BMD for    a:   *  Major Osteoporotic fracture : 28%    *  Hip fracture: 12%       FRAX is a computer-based algorithm which uses clinical risk factors and    DXA to estimate a person's 10 year fracture probability. Consider therapy if the FRAX score is:   1.   20% or greater for a major osteoporotic fracture and/or   2.   3% or greater for the hip. FRAX may underestimate the risk for fracture in patients who experience    falls, have multiple fractures and whose degree of glucocorticoid    exposure, cigarette use and alcohol use is greater. The model accepts ages between 36 and 80 years. If ages below or above are    entered, the program will compute probabilities at 36 and 90 year,    respectively. This dictation was created with voice recognition software. While    attempts have been made to review the dictation as it is transcribed, on    occasion the spoken word can be misinterpreted by the technology leading    to omissions or inappropriate words, phrases or sentences. Electronically Signed by: Domenica Ashraf DO, 3/1/2022 1:30 PM         Anatomical Region Laterality Modality   Hip -- Radiographic Imaging   C-spine -- --   T-spine -- --   L-spine -- --     Impression    1. Bone mineral density compatible with osteoporosis, placing the patient at increased risk for fracture. FRAX risk assessment :   The estimated 10 year probability/risk of fracture (percent) with BMD for a:   *  Major Osteoporotic fracture : 28%   *  Hip fracture: 12%     FRAX is a computer-based algorithm which uses clinical risk factors and DXA to estimate a person's 10 year fracture probability.      Consider therapy if the FRAX score is:   1.   20% or greater for a major osteoporotic fracture and/or   2.   3% or greater for the hip. FRAX may underestimate the risk for fracture in patients who experience falls, have multiple fractures and whose degree of glucocorticoid exposure, cigarette use and alcohol use is greater. The model accepts ages between 36 and 80 years. If ages below or above are entered, the program will compute probabilities at 36 and 90 year, respectively. This dictation was created with voice recognition software. While attempts have been made to review the dictation as it is transcribed, on occasion the spoken word can be misinterpreted by the technology leading to omissions or inappropriate words, phrases or sentences.          Electronically Signed by: Hola Yarbrough DO 3/1/2022 1:30 PM      Past Medical History:   Diagnosis Date    Anxiety     Chronic back pain     GERD (gastroesophageal reflux disease)     History of compression fracture of spine 2/16/2021    Irritable bowel syndrome     Post-traumatic osteoarthritis, right ankle and foot 12/27/2018    Urinary incontinence      Patient Active Problem List    Diagnosis Date Noted    History of gastric bypass 05/04/2021    Vitamin D deficiency 05/04/2021    History of compression fracture of spine 02/16/2021    Acquired hypothyroidism 02/19/2019    Secondary osteoarthritis, left ankle and foot 12/27/2018    Osteoporosis 11/03/2015     Past Surgical History:   Procedure Laterality Date    [address]  2010    SMALL INTESTINE SURGERY  2000    TUBAL LIGATION      and reversed     Family History   Problem Relation Age of Onset    Arthritis Mother     Asthma Mother     Arthritis Father     Asthma Father     Stroke Father     Arthritis Sister     Asthma Sister     Cancer Sister     Arthritis Brother     Asthma Brother     Diabetes Brother     Heart Disease Brother     High Blood Pressure Brother      Social History     Socioeconomic History    Marital status:      Spouse name: None    Number of children: None    Years of education: None    Highest education level: None   Tobacco Use    Smoking status: Never    Smokeless tobacco: Never   Vaping Use    Vaping Use: Never used   Substance and Sexual Activity    Alcohol use: No    Drug use: No    Sexual activity: Never     Current Outpatient Medications   Medication Sig Dispense Refill    levothyroxine (SYNTHROID) 50 MCG tablet Take 75 mcg by mouth Daily       fluticasone (FLONASE) 50 MCG/ACT nasal spray 1 spray by Each Nare route daily      ALPRAZolam (XANAX) 0.5 MG tablet Take 0.5 mg by mouth in the morning and at bedtime. busPIRone (BUSPAR) 15 MG tablet Take 15 mg by mouth daily Take one half tablet in the AM and one half tablet in the PM      Calcium Citrate-Vitamin D (CITRACAL MAXIMUM PO) Take by mouth Calcium 800 mg , Vitamin [id number] IU  Takes 2-3 daily      Multiple Vitamins-Minerals (THERAPEUTIC MULTIVITAMIN-MINERALS) tablet Take 2 tablets by mouth daily      Fiber, Guar Gum, CHEW Take by mouth      cetirizine (ZYRTEC) 10 MG tablet Take 10 mg by mouth daily      denosumab (PROLIA) 60 MG/ML SOSY SC injection Inject 1 mL into the skin once for 1 dose 1 mL 0    NONFORMULARY Place 125 mg under the tongue daily Hemp oil - sublingual (2-3 drops once daily) (Patient not taking: Reported on 2022)      Ascorbic Acid (VITAMIN C) 500 MG tablet Take 500 mg by mouth daily (Patient not taking: Reported on 2022)      Cranberry 500 MG CHEW Take by mouth (Patient not taking: No sig reported)       No current facility-administered medications for this visit.      Allergies   Allergen Reactions    Codeine Anaphylaxis, Nausea Only, Other (See Comments) and Rash     Chest tightness    Ciprofloxacin Rash     Family Status   Relation Name Status    Mother      Father      Sister  (Not Specified)    Brother  (Not Specified)       Review of Systems:  Constitutional: has fatigue, no fever, no recent weight gain, no recent weight loss, no changes in appetite  Eyes: no eye pain, no change in vision, no eye redness, no eye irritation, no double vision  Ears, nose, throat: no nasal congestion, no sore throat, no earache, no decrease in hearing, no hoarseness, no dry mouth, no sinus problems, no difficulty swallowing, no neck lumps, no dental problems, no mouth sores, no ringing in ears  Pulmonary: no shortness of breath, no wheezing, no dyspnea on exertion, no cough  Cardiovascular: no chest pain, no lower extremity edema, no orthopnea, no intermittent leg claudication, no palpitations  Gastrointestinal: no abdominal pain, no nausea, no vomiting, has diarrhea, has constipation, no dysphagia, has heartburn, has bloating  Genitourinary: no dysuria, no urinary incontinence, no urinary hesitancy, no urinary frequency, no feelings of urinary urgency, no nocturia  Musculoskeletal: has joint swelling, has joint stiffness, has joint pain, no muscle cramps, no muscle pain,   Integument/Breast: no hair loss, no skin rashes, no skin lesions, no itching, has dry skin  Neurological: no numbness, no tingling, no weakness, no confusion, no headaches, no dizziness, no fainting, no tremors, no decrease in memory, no balance problems  Psychiatric: no anxiety, no depression, no insomnia  Hematologic/Lymphatic: no tendency for easy bleeding, no swollen lymph nodes, no tendency for easy bruising  Immunology: has seasonal allergies, no frequent infections, no frequent illnesses  Endocrine: no temperature intolerance    /78   Pulse 83   Temp 98 °F (36.7 °C)   Resp 14   Ht 5' (1.524 m)   Wt 142 lb 9.6 oz (64.7 kg)   SpO2 97%   BMI 27.85 kg/m²    Wt Readings from Last 3 Encounters:   12/05/22 142 lb 9.6 oz (64.7 kg)   06/16/22 145 lb (65.8 kg)   05/05/21 140 lb (63.5 kg)     Body mass index is 27.85 kg/m².     OBJECTIVE:  Constitutional: no acute distress, well appearing and well nourished  Psychiatric: oriented to person, place and time, judgement and insight and normal, recent and remote memory and intact and mood and affect are normal  Skin: skin and subcutaneous tissue is normal without mass, normal turgor  Head and Face: examination of head and face revealed no abnormalities  Eyes: no lid or conjunctival swelling, erythema or discharge, pupils are normal, equal, round, reactive to light  Ears/Nose: external inspection of ears and nose revealed no abnormalities, hearing is grossly normal  Oropharynx/Mouth/Face: lips, tongue and gums are normal with no lesions, the voice quality was normal  Neck: neck is supple and symmetric, with midline trachea and no masses, thyroid is normal  Lymphatics: normal cervical lymph nodes, normal supraclavicular nodes  Pulmonary: no increased work of breathing or signs of respiratory distress, lungs are clear to auscultation  Cardiovascular: normal heart rate and rhythm, normal S1 and S2, no murmurs and pedal pulses and 2+ bilaterally, No edema  Abdomen: abdomen is soft, non-tender with no masses  Musculoskeletal: normal gait and station and exam of the digits and nails are normal  Neurological: normal coordination and normal general cortical function      Lab Review:    Lab Results   Component Value Date/Time    MCV 87.3 03/01/2022 08:21 AM     Lab Results   Component Value Date/Time     12/02/2022 07:31 AM    K 4.5 12/02/2022 07:31 AM     12/02/2022 07:31 AM    CO2 27 12/02/2022 07:31 AM    BUN 15 12/02/2022 07:31 AM    GLUCOSE 105 12/02/2022 07:31 AM    CALCIUM 9.6 12/02/2022 07:31 AM    LABALBU 4.2 12/02/2022 07:31 AM    BILITOT 0.7 12/02/2022 07:31 AM    BILITOT 0.5 09/16/2019 07:15 AM    ALKPHOS 45 12/02/2022 07:31 AM    AST 19 12/02/2022 07:31 AM    ALT 10 12/02/2022 07:31 AM    LABGLOM > 60 03/01/2022 08:21 AM    LABGLOM > 60 03/01/2022 08:21 AM    LABGLOM > 60 09/16/2019 07:15 AM    GFRAA > 60 09/16/2019 07:15 AM    AGRATIO 1.3 12/02/2022 07:31 AM    GLOB 3.2 12/02/2022 07:31 AM     Lab Results   Component Value Date/Time    TSH 1.420 03/01/2022 08:21 AM    TSH 1.567 09/16/2019 07:15 AM    FT3 2.2 09/16/2019 07:15 AM     Lab Results   Component Value Date/Time    LABA1C 5.6 03/01/2022 08:21 AM     No results found for: EAG  No results found for: CHOL  Lab Results   Component Value Date/Time    TRIG 57 03/01/2022 08:21 AM     Lab Results   Component Value Date/Time    HDL 73 03/01/2022 08:21 AM     Lab Results   Component Value Date/Time    LDLCALC 89 03/01/2022 08:21 AM     Lab Results   Component Value Date/Time    LABVLDL 11 03/01/2022 08:21 AM     No results found for: CHOLHDLRATIO  No results found for: Ivan Graft  Lab Results   Component Value Date/Time    VITD25 46.9 12/02/2022 07:31 AM        ASSESSMENT/PLAN:    1. Age-related osteoporosis without current pathological fracture  12/2021 Prolia resumed again, continue  -Calcium in food, vitamin D  - Vitamin D 25 Hydroxy; Future  -CMP  -DEXA  Compression fractures of thoracic spine in 2014 and lumbar spine in 2015. DEXA scan in 2014 and 2015 showed osteoporosis  Has history of gastric bypass in the past  Normal vitamin D level, PTH level, calcium level, 24-hour urine calcium level    On Forteo 12/2015-9/2017  Stopped as well as having joint swelling  Had Prolia 9/2017-9/2018  DEXA scan in January 2018 showed osteoporosis with T score of -3.5 in the lumbar spine and -3.3 in right hip  Repeat DEXA scan in January 2019 showed T score 0.2 in lumbar spine, improvement in right and left hip  Was on Prolia  Last injection of Prolia was 9/2019 2020 DEXA scan showed stable bone density  On Evenity since 4/2020  She experienced more aches and pains  Patient will start treatment with Prolia, now had 12 months of Evenity, finished 5/2021 2022 DEXA scan showed bone density: forearm -2.2, left femoral neck -2.9, left total hip -2.09, right femoral neck -3.6, total right hip -1.7    2.  Acquired hypothyroidism  TSH 1.42  TSH 1.675 in 8/2020  Continue levothyroxine 0.05 mg daily  - T4, Free; Future  - TSH without Reflex; Future  - Comprehensive Metabolic Panel; Future    3. History of compression fracture of spine  Compression fracture of her lumbar spine, status post kyphoplasty  Followed by Dr. Doug Jarquin at [facility]  Continue monitoring  Treat osteoporosis    4. History of gastric bypass  Continue monitoring vitamin D  Hemoglobin A1c 5.6    5. Vitamin D deficiency  Calcium 9.3-9.6  25 hydroxy vitamin D 85-54.6-46.9  Calcium 600 mg daily with vitamin D  Caltrate 250 mg 2 tablets daily, decrease with vitamin D  - T4, Free; Future  - TSH without Reflex; Future  - Comprehensive Metabolic Panel; Future  - Vitamin D 25 Hydroxy; Future     Reviewed and/or ordered clinical lab results Yes  Reviewed and/or ordered radiology tests Yes   Reviewed and/or ordered other diagnostic tests No  Discussed test results with performing physician No  Independently reviewed image, tracing, or specimen No  Made a decision to obtain old records No  Reviewed and summarized old records Yes   TSH 1.675  Calcium 8.9  25 hydroxy vitamin D 74.8  Obtained history from other than patient No    Astrid Lopez was counseled regarding symptoms of osteoporosis diagnosis, course and complications of disease if inadequately treated, side effects of medications, diagnosis, treatment options, and prognosis, risks, benefits, complications, and alternatives of treatment, labs, imaging and other studies and treatment targets and goals. She understands instructions and counseling. Total time I spent for this encounter 30 minutes    Return in about 6 months (around 6/5/2023) for osteoporosis.     Electronically signed by Niko Simpson MD on 12/5/2022 at 1:31 PM

## 2023-03-24 LAB
A/G RATIO: 1.3 (ref 1–2)
ALBUMIN SERPL-MCNC: 3.8 G/DL (ref 3.5–5.7)
ALBUMIN/PROTEIN TOTAL, SER/PL: 6.7 G/DL (ref 6–8.3)
ALP BLD-CCNC: 52 U/L (ref 34–104)
ALT SERPL-CCNC: 13 U/L (ref 7–52)
ANION GAP 4: 8 MMOL/L (ref 7–16)
AST W/O P-5'-P: 21 U/L (ref 13–39)
BILIRUB SERPL-MCNC: 0.5 MG/DL (ref 0.3–1)
BUN BLDV-MCNC: 19 MG/DL (ref 7–25)
CALCIUM SERPL-MCNC: 8.9 MG/DL (ref 8.6–10.2)
CHLORIDE BLD-SCNC: 103 MMOL/L (ref 98–107)
CO2: 26 MMOL/L (ref 21–31)
CREAT SERPL-MCNC: 0.87 MG/DL (ref 0.6–1.2)
EGFR FEMALE: 72 ML/MIN/1.73M2
GLOBULIN: 2.9 G/DL (ref 2.6–4.2)
GLUCOSE: 95 MG/DL (ref 74–109)
POTASSIUM SERPL-SCNC: 4.5 MMOL/L (ref 3.5–5.1)
SODIUM BLD-SCNC: 137 MMOL/L (ref 136–145)
VITAMIN D 25-HYDROXY: 42.2 NG/ML

## 2023-06-06 ENCOUNTER — OFFICE VISIT (OUTPATIENT)
Dept: ENDOCRINOLOGY | Age: 68
End: 2023-06-06
Payer: MEDICARE

## 2023-06-06 VITALS
HEART RATE: 73 BPM | BODY MASS INDEX: 28.27 KG/M2 | RESPIRATION RATE: 14 BRPM | WEIGHT: 144 LBS | HEIGHT: 60 IN | OXYGEN SATURATION: 98 % | DIASTOLIC BLOOD PRESSURE: 60 MMHG | SYSTOLIC BLOOD PRESSURE: 108 MMHG | TEMPERATURE: 98 F

## 2023-06-06 DIAGNOSIS — E55.9 VITAMIN D DEFICIENCY: ICD-10-CM

## 2023-06-06 DIAGNOSIS — M81.0 AGE-RELATED OSTEOPOROSIS WITHOUT CURRENT PATHOLOGICAL FRACTURE: Primary | ICD-10-CM

## 2023-06-06 DIAGNOSIS — Z98.84 HISTORY OF GASTRIC BYPASS: ICD-10-CM

## 2023-06-06 DIAGNOSIS — Z87.81 HISTORY OF COMPRESSION FRACTURE OF SPINE: ICD-10-CM

## 2023-06-06 DIAGNOSIS — E03.9 ACQUIRED HYPOTHYROIDISM: ICD-10-CM

## 2023-06-06 PROCEDURE — 99214 OFFICE O/P EST MOD 30 MIN: CPT | Performed by: INTERNAL MEDICINE

## 2023-06-06 PROCEDURE — 1123F ACP DISCUSS/DSCN MKR DOCD: CPT | Performed by: INTERNAL MEDICINE

## 2023-06-06 PROCEDURE — 96372 THER/PROPH/DIAG INJ SC/IM: CPT | Performed by: INTERNAL MEDICINE

## 2023-06-06 RX ORDER — LISINOPRIL 2.5 MG/1
TABLET ORAL DAILY
COMMUNITY
Start: 2023-05-15

## 2023-06-06 RX ORDER — ATORVASTATIN CALCIUM 80 MG/1
TABLET, FILM COATED ORAL NIGHTLY
COMMUNITY
Start: 2023-05-15

## 2023-06-06 RX ORDER — ASPIRIN 81 MG/1
TABLET ORAL
COMMUNITY
Start: 2023-05-16

## 2023-06-06 RX ORDER — LEVOTHYROXINE SODIUM 0.05 MG/1
50 TABLET ORAL DAILY
Qty: 30 TABLET | Refills: 0
Start: 2023-06-06

## 2023-06-06 RX ORDER — METOPROLOL SUCCINATE 25 MG/1
TABLET, EXTENDED RELEASE ORAL DAILY
COMMUNITY
Start: 2023-05-16

## 2023-06-06 NOTE — PROGRESS NOTES
Informed patient if any signs of redness,rash,swelling or unusual symptoms occur, please contact the office. Prolia given per physician order. Office supplied, subq to FERNIE Gusman approval on file.
DEXA scan showed bone density: forearm -2.4, left femoral neck -2.8, left total hip -2.8, right femoral neck -2.2, total right hip --2.5    2. Acquired hypothyroidism  TSH 1.42  TSH 1.675 in 8/2020  Continue levothyroxine 0.05 mg daily  - T4, Free; Future  - TSH without Reflex; Future  - Comprehensive Metabolic Panel; Future    3. History of compression fracture of spine  Compression fracture of her lumbar spine, status post kyphoplasty  Followed by Dr. Flcao Spann at [facility]  Continue monitoring  Treat osteoporosis    4. History of gastric bypass  Continue monitoring vitamin D  Hemoglobin A1c 5.6    5. Vitamin D deficiency  Calcium 9.3-9.6-8.9  25 hydroxy vitamin D 85-54.6-46.9-42.2  Calcium 600 mg daily with vitamin D  Caltrate 250 mg 2 tablets daily, decrease with vitamin D  - T4, Free; Future  - TSH without Reflex; Future  - Comprehensive Metabolic Panel; Future  - Vitamin D 25 Hydroxy; Future     Reviewed and/or ordered clinical lab results Yes  Reviewed and/or ordered radiology tests Yes   Reviewed and/or ordered other diagnostic tests No  Discussed test results with performing physician No  Independently reviewed image, tracing, or specimen No  Made a decision to obtain old records No  Reviewed and summarized old records Yes   TSH 1.675  Calcium 8.9  25 hydroxy vitamin D 74.8  Obtained history from other than patient No    Shiela Patel was counseled regarding symptoms of osteoporosis diagnosis, course and complications of disease if inadequately treated, side effects of medications, diagnosis, treatment options, and prognosis, risks, benefits, complications, and alternatives of treatment, labs, imaging and other studies and treatment targets and goals. She understands instructions and counseling. Total time I spent for this encounter 30 minutes    Return in about 6 months (around 12/6/2023) for osteoporosis.     Electronically signed by Maegan Hernandez MD on 6/6/2023 at 10:40 AM

## 2023-12-05 LAB
A/G RATIO: 1.6 (ref 1–2)
ALBUMIN SERPL-MCNC: 4.2 G/DL (ref 3.5–5.7)
ALBUMIN/PROTEIN TOTAL, SER/PL: 6.8 G/DL (ref 6–8.3)
ALP BLD-CCNC: 41 U/L (ref 34–104)
ALT SERPL-CCNC: 14 U/L (ref 7–52)
ANION GAP 4: 10 MMOL/L (ref 7–16)
AST W/O P-5'-P: 24 U/L (ref 13–39)
BILIRUB SERPL-MCNC: 0.8 MG/DL (ref 0.3–1)
BUN BLDV-MCNC: 13 MG/DL (ref 7–25)
CALCIUM SERPL-MCNC: 9.1 MG/DL (ref 8.6–10.2)
CHLORIDE BLD-SCNC: 103 MMOL/L (ref 98–107)
CO2: 26 MMOL/L (ref 21–31)
CREAT SERPL-MCNC: 0.81 MG/DL (ref 0.6–1.2)
ESTIMATED GLOMERULAR FILTRATION RATE CREATININE EQUATION: 79 ML/MIN/1.73M2
GLOBULIN: 2.6 G/DL (ref 2.6–4.2)
GLUCOSE: 102 MG/DL (ref 74–109)
POTASSIUM SERPL-SCNC: 4.4 MMOL/L (ref 3.5–5.1)
SODIUM BLD-SCNC: 139 MMOL/L (ref 136–145)
T4 FREE: 1.16 NG/DL (ref 0.61–1.12)
TSH ULTRASENSITIVE: 0.97 UIU/ML (ref 0.45–5.33)
VITAMIN D 25-HYDROXY: 46.6 NG/ML

## 2023-12-07 ENCOUNTER — OFFICE VISIT (OUTPATIENT)
Dept: ENDOCRINOLOGY | Age: 68
End: 2023-12-07
Payer: MEDICARE

## 2023-12-07 VITALS
SYSTOLIC BLOOD PRESSURE: 118 MMHG | BODY MASS INDEX: 26.7 KG/M2 | OXYGEN SATURATION: 97 % | HEIGHT: 60 IN | RESPIRATION RATE: 14 BRPM | WEIGHT: 136 LBS | DIASTOLIC BLOOD PRESSURE: 63 MMHG | TEMPERATURE: 98 F | HEART RATE: 75 BPM

## 2023-12-07 DIAGNOSIS — Z87.81 HISTORY OF COMPRESSION FRACTURE OF SPINE: ICD-10-CM

## 2023-12-07 DIAGNOSIS — E03.9 ACQUIRED HYPOTHYROIDISM: ICD-10-CM

## 2023-12-07 DIAGNOSIS — Z98.84 HISTORY OF GASTRIC BYPASS: ICD-10-CM

## 2023-12-07 DIAGNOSIS — M81.0 AGE-RELATED OSTEOPOROSIS WITHOUT CURRENT PATHOLOGICAL FRACTURE: Primary | ICD-10-CM

## 2023-12-07 DIAGNOSIS — E55.9 VITAMIN D DEFICIENCY: ICD-10-CM

## 2023-12-07 PROCEDURE — 99214 OFFICE O/P EST MOD 30 MIN: CPT | Performed by: INTERNAL MEDICINE

## 2023-12-07 PROCEDURE — 96372 THER/PROPH/DIAG INJ SC/IM: CPT | Performed by: INTERNAL MEDICINE

## 2023-12-07 PROCEDURE — 1123F ACP DISCUSS/DSCN MKR DOCD: CPT | Performed by: INTERNAL MEDICINE

## 2023-12-07 RX ORDER — ESCITALOPRAM OXALATE 5 MG/1
5 TABLET ORAL DAILY
COMMUNITY
Start: 2023-10-19

## 2023-12-07 NOTE — PROGRESS NOTES
Informed patient if any signs of redness,rash,swelling or unusual symptoms occur, please contact the office. Prolia given per physician order. Office supplied, subq to BERTIN.
results found for: \"CHOL\"  Lab Results   Component Value Date/Time    TRIG 57 03/01/2022 08:21 AM     Lab Results   Component Value Date/Time    HDL 73 03/01/2022 08:21 AM     Lab Results   Component Value Date/Time    LDLCALC 89 03/01/2022 08:21 AM     Lab Results   Component Value Date/Time    LABVLDL 11 03/01/2022 08:21 AM     No results found for: \"CHOLHDLRATIO\"  No results found for: \"JNHA91LVV\"  Lab Results   Component Value Date/Time    VITD25 46.6 12/05/2023 07:57 AM        ASSESSMENT/PLAN:    1. Age-related osteoporosis without current pathological fracture  Had heart attack in 5/2023 12/2021 Prolia resumed again, continue  -Calcium in food, vitamin D  - Vitamin D 25 Hydroxy; Future  -CMP  -DEXA  Compression fractures of thoracic spine in 2014 and lumbar spine in 2015. DEXA scan in 2014 and 2015 showed osteoporosis  Has history of gastric bypass in the past  Normal vitamin D level, PTH level, calcium level, 24-hour urine calcium level    On Forteo 12/2015-9/2017  Stopped as well as having joint swelling  Had Prolia 9/2017-9/2018  DEXA scan in January 2018 showed osteoporosis with T score of -3.5 in the lumbar spine and -3.3 in right hip  Repeat DEXA scan in January 2019 showed T score 0.2 in lumbar spine, improvement in right and left hip  Was on Prolia  Last injection of Prolia was 9/2019 2020 DEXA scan showed stable bone density  On Evenity since 4/2020  She experienced more aches and pains  Patient will start treatment with Prolia, now had 12 months of Evenity, finished 5/2021 2022 DEXA scan showed bone density: forearm -2.2, left femoral neck -2.9, left total hip -2.8, right femoral neck -3.6, total right hip -1.7  2023 DEXA scan showed bone density: forearm -2.4, left femoral neck -2.8, left total hip -2.8, right femoral neck -2.2, total right hip --2.5    2.  Acquired hypothyroidism  TSH 1.42-0.967  TSH 1.675 in 8/2020  Continue levothyroxine 0.05 mg daily  - T4, Free; Future  - TSH without Reflex;

## 2024-04-21 ENCOUNTER — TELEPHONE (OUTPATIENT)
Dept: ENDOCRINOLOGY | Age: 69
End: 2024-04-21

## 2024-04-21 NOTE — TELEPHONE ENCOUNTER
Please inform patient that bone density improved in the left hip per radiology, good result.  Will discuss more during appointment.

## 2024-05-23 LAB
T4 FREE: 0.9 NG/DL (ref 0.61–1.12)
TSH SERPL DL<=0.05 MIU/L-ACNC: 1.81 UIU/ML (ref 0.45–5.33)
VITAMIN D 25-HYDROXY: 52.1 NG/ML

## 2024-05-29 ENCOUNTER — TELEPHONE (OUTPATIENT)
Dept: ENDOCRINOLOGY | Age: 69
End: 2024-05-29

## 2024-06-09 NOTE — PROGRESS NOTES
decrease in hearing, no hoarseness, no dry mouth, no sinus problems, no difficulty swallowing, no neck lumps, no dental problems, no mouth sores, no ringing in ears  Pulmonary: no shortness of breath, no wheezing, no dyspnea on exertion, no cough  Cardiovascular: no chest pain, no lower extremity edema, no orthopnea, no intermittent leg claudication, no palpitations  Gastrointestinal: no abdominal pain, no nausea, no vomiting, has diarrhea, has constipation, no dysphagia, has heartburn, has bloating  Genitourinary: no dysuria, no urinary incontinence, no urinary hesitancy, no urinary frequency, no feelings of urinary urgency, no nocturia  Musculoskeletal: has joint swelling, has joint stiffness, has joint pain, no muscle cramps, no muscle pain,   Integument/Breast: no hair loss, no skin rashes, no skin lesions, no itching, has dry skin  Neurological: no numbness, no tingling, no weakness, no confusion, no headaches, no dizziness, no fainting, no tremors, no decrease in memory, no balance problems  Psychiatric: no anxiety, no depression, no insomnia  Hematologic/Lymphatic: no tendency for easy bleeding, no swollen lymph nodes, no tendency for easy bruising  Immunology: has seasonal allergies, no frequent infections, no frequent illnesses  Endocrine: no temperature intolerance    There were no vitals taken for this visit.   Wt Readings from Last 3 Encounters:   12/07/23 61.7 kg (136 lb)   06/06/23 65.3 kg (144 lb)   12/05/22 64.7 kg (142 lb 9.6 oz)     There is no height or weight on file to calculate BMI.    OBJECTIVE:  Constitutional: no apparent distress, well developed and well nourished  Mental status: alert and awake, oriented to person, place and time, able to follow commands  Psychiatric: judgement and insight and normal, recent and remote memory are intact, mood and affect are normal  Skin: facial skin inspection appears normal, no significant exanthematous lesions or discoloration noted on facial skin  Head

## 2024-06-10 ENCOUNTER — TELEPHONE (OUTPATIENT)
Dept: ENDOCRINOLOGY | Age: 69
End: 2024-06-10

## 2024-06-10 ENCOUNTER — TELEMEDICINE (OUTPATIENT)
Dept: ENDOCRINOLOGY | Age: 69
End: 2024-06-10

## 2024-06-10 DIAGNOSIS — Z98.84 HISTORY OF GASTRIC BYPASS: ICD-10-CM

## 2024-06-10 DIAGNOSIS — E55.9 VITAMIN D DEFICIENCY: ICD-10-CM

## 2024-06-10 DIAGNOSIS — Z87.81 HISTORY OF COMPRESSION FRACTURE OF SPINE: ICD-10-CM

## 2024-06-10 DIAGNOSIS — M89.8X5 PAIN OF LEFT FEMUR: ICD-10-CM

## 2024-06-10 DIAGNOSIS — E03.9 ACQUIRED HYPOTHYROIDISM: ICD-10-CM

## 2024-06-10 DIAGNOSIS — M81.0 AGE-RELATED OSTEOPOROSIS WITHOUT CURRENT PATHOLOGICAL FRACTURE: Primary | ICD-10-CM

## 2024-06-10 PROCEDURE — NBSRV NON-BILLABLE SERVICE: Performed by: INTERNAL MEDICINE

## 2024-06-10 PROCEDURE — 99214 OFFICE O/P EST MOD 30 MIN: CPT | Performed by: INTERNAL MEDICINE

## 2024-06-10 PROCEDURE — G2211 COMPLEX E/M VISIT ADD ON: HCPCS | Performed by: INTERNAL MEDICINE

## 2024-06-10 PROCEDURE — 1123F ACP DISCUSS/DSCN MKR DOCD: CPT | Performed by: INTERNAL MEDICINE

## 2024-06-12 NOTE — TELEPHONE ENCOUNTER
Spoke w/ pt. Pt busted her knee up after a fall in her garage yesterday. She will call today to get that fax #.

## 2024-06-15 ENCOUNTER — TELEPHONE (OUTPATIENT)
Dept: ENDOCRINOLOGY | Age: 69
End: 2024-06-15

## 2024-06-15 NOTE — TELEPHONE ENCOUNTER
Please inform patient that left femur x-ray did not show fractures.  That means it is not likely related to osteoporosis medications. Please contact family doctor for left leg pain.

## 2024-06-24 ENCOUNTER — NURSE ONLY (OUTPATIENT)
Dept: ENDOCRINOLOGY | Age: 69
End: 2024-06-24
Payer: MEDICARE

## 2024-06-24 DIAGNOSIS — M81.0 AGE-RELATED OSTEOPOROSIS WITHOUT CURRENT PATHOLOGICAL FRACTURE: Primary | ICD-10-CM

## 2024-06-24 PROCEDURE — 96372 THER/PROPH/DIAG INJ SC/IM: CPT | Performed by: INTERNAL MEDICINE

## 2024-06-24 NOTE — PROGRESS NOTES
Informed patient if any signs of redness,rash,swelling or unusual symptoms occur, please contact the office. Prolia given per physician order.    Subq to FERNIE.    No pa rebirgit'd

## 2024-11-27 ENCOUNTER — TELEPHONE (OUTPATIENT)
Dept: ENDOCRINOLOGY | Age: 69
End: 2024-11-27

## 2024-11-29 NOTE — TELEPHONE ENCOUNTER
Submitted PA for Prolia  Via CMM Key: [id number]  STATUS: PENDING.    Follow up done daily; if no decision with in three days we will refax.  If another three days goes by with no decision will call the insurance for status.

## 2025-01-03 ENCOUNTER — OFFICE VISIT (OUTPATIENT)
Dept: ENDOCRINOLOGY | Age: 70
End: 2025-01-03
Payer: MEDICARE

## 2025-01-03 VITALS
HEIGHT: 60 IN | RESPIRATION RATE: 14 BRPM | HEART RATE: 73 BPM | BODY MASS INDEX: 27.68 KG/M2 | TEMPERATURE: 98 F | DIASTOLIC BLOOD PRESSURE: 64 MMHG | OXYGEN SATURATION: 100 % | WEIGHT: 141 LBS | SYSTOLIC BLOOD PRESSURE: 119 MMHG

## 2025-01-03 DIAGNOSIS — Z98.84 HISTORY OF GASTRIC BYPASS: ICD-10-CM

## 2025-01-03 DIAGNOSIS — E03.9 ACQUIRED HYPOTHYROIDISM: ICD-10-CM

## 2025-01-03 DIAGNOSIS — Z87.81 HISTORY OF COMPRESSION FRACTURE OF SPINE: ICD-10-CM

## 2025-01-03 DIAGNOSIS — M81.0 AGE-RELATED OSTEOPOROSIS WITHOUT CURRENT PATHOLOGICAL FRACTURE: Primary | ICD-10-CM

## 2025-01-03 DIAGNOSIS — E55.9 VITAMIN D DEFICIENCY: ICD-10-CM

## 2025-01-03 PROCEDURE — 96372 THER/PROPH/DIAG INJ SC/IM: CPT | Performed by: INTERNAL MEDICINE

## 2025-01-03 PROCEDURE — 1159F MED LIST DOCD IN RCRD: CPT | Performed by: INTERNAL MEDICINE

## 2025-01-03 PROCEDURE — 1160F RVW MEDS BY RX/DR IN RCRD: CPT | Performed by: INTERNAL MEDICINE

## 2025-01-03 PROCEDURE — 99214 OFFICE O/P EST MOD 30 MIN: CPT | Performed by: INTERNAL MEDICINE

## 2025-01-03 PROCEDURE — 1123F ACP DISCUSS/DSCN MKR DOCD: CPT | Performed by: INTERNAL MEDICINE

## 2025-01-03 NOTE — PROGRESS NOTES
Informed patient if any signs of redness,rash,swelling or unusual symptoms occur, please contact the office. Prolia given per physician order.    Office supplied, pa approved    Subq to BERTIN  
-2.8, right femoral neck -2.2, total right hip --2.5  6/14/2024 left femur x-rays  No fractures are seen    2. Acquired hypothyroidism  TSH 1.42-0.967-1.813-4.69  TSH 1.675 in 8/2020  Continue levothyroxine 0.05 mg daily  - T4, Free; Future  - TSH without Reflex; Future  - Comprehensive Metabolic Panel; Future    3. History of compression fracture of spine  Compression fracture of her lumbar spine, status post kyphoplasty  Followed by Dr. Gold at [facility]  Continue monitoring  Treat osteoporosis    4. History of gastric bypass  Continue monitoring vitamin D  Hemoglobin A1c 5.6    5. Vitamin D deficiency  Calcium 9.3-9.6-8.9-9.1-9.7-9.0  25 hydroxy vitamin D 85-54.6-46.9-42.2-46.6-52.1  Calcium 600 mg daily with vitamin D  Caltrate 250 mg 2 tablets daily, decrease with vitamin D  - Comprehensive Metabolic Panel; Future  - Vitamin D 25 Hydroxy; Future     Reviewed and/or ordered clinical lab results Yes  Reviewed and/or ordered radiology tests Yes   Reviewed and/or ordered other diagnostic tests No  Discussed test results with performing physician No  Independently reviewed image, tracing, or specimen No  Made a decision to obtain old records No  Reviewed and summarized old records Yes   TSH 1.675  Calcium 8.9  25 hydroxy vitamin D 74.8  Obtained history from other than patient No    Tish Martines was counseled regarding symptoms of osteoporosis diagnosis, course and complications of disease if inadequately treated, side effects of medications, diagnosis, treatment options, and prognosis, risks, benefits, complications, and alternatives of treatment, labs, imaging and other studies and treatment targets and goals.  She understands instructions and counseling.     Return in about 6 months (around 7/3/2025) for thyroid problems, osteoporosis.    Electronically signed by Yoselin Astorga MD on 1/3/2025 at 10:48 AM

## 2025-06-30 LAB
A/G RATIO: 1.6 (ref 1–2)
ALBUMIN: 4.1 G/DL (ref 3.5–5.7)
ALP BLD-CCNC: 64 U/L (ref 34–104)
ALT SERPL-CCNC: 13 U/L (ref 7–52)
ANION GAP SERPL CALCULATED.3IONS-SCNC: 5 MMOL/L (ref 7–16)
AST SERPL-CCNC: 23 U/L (ref 13–39)
BILIRUB SERPL-MCNC: 0.5 MG/DL (ref 0.3–1)
BUN BLDV-MCNC: 11 MG/DL (ref 7–25)
CALCIUM SERPL-MCNC: 9.8 MG/DL (ref 8.6–10.2)
CHLORIDE BLD-SCNC: 102 MMOL/L (ref 98–107)
CO2: 31 MMOL/L (ref 21–31)
CREAT SERPL-MCNC: 0.95 MG/DL (ref 0.6–1.2)
EGFR FEMALE: 64 ML/MIN/1.73M2
GLOBULIN: 2.6 G/DL (ref 2.6–4.2)
GLUCOSE BLD-MCNC: 101 MG/DL (ref 74–109)
POTASSIUM SERPL-SCNC: 4.8 MMOL/L (ref 3.5–5.1)
SODIUM BLD-SCNC: 138 MMOL/L (ref 136–145)
T4 FREE: 0.7 NG/DL (ref 0.61–1.12)
TOTAL PROTEIN: 6.7 G/DL (ref 6–8.3)
TSH SERPL DL<=0.05 MIU/L-ACNC: 2.47 UIU/ML (ref 0.45–5.33)
VITAMIN D 25-HYDROXY: 30.9 NG/ML

## 2025-07-07 ENCOUNTER — OFFICE VISIT (OUTPATIENT)
Dept: ENDOCRINOLOGY | Age: 70
End: 2025-07-07
Payer: MEDICARE

## 2025-07-07 VITALS
RESPIRATION RATE: 14 BRPM | OXYGEN SATURATION: 100 % | SYSTOLIC BLOOD PRESSURE: 138 MMHG | HEIGHT: 60 IN | WEIGHT: 152 LBS | BODY MASS INDEX: 29.84 KG/M2 | HEART RATE: 54 BPM | TEMPERATURE: 98 F | DIASTOLIC BLOOD PRESSURE: 87 MMHG

## 2025-07-07 DIAGNOSIS — E55.9 VITAMIN D DEFICIENCY: ICD-10-CM

## 2025-07-07 DIAGNOSIS — Z98.84 HISTORY OF GASTRIC BYPASS: ICD-10-CM

## 2025-07-07 DIAGNOSIS — E03.9 ACQUIRED HYPOTHYROIDISM: ICD-10-CM

## 2025-07-07 DIAGNOSIS — M81.0 AGE-RELATED OSTEOPOROSIS WITHOUT CURRENT PATHOLOGICAL FRACTURE: Primary | ICD-10-CM

## 2025-07-07 DIAGNOSIS — Z87.81 HISTORY OF COMPRESSION FRACTURE OF SPINE: ICD-10-CM

## 2025-07-07 PROCEDURE — 1123F ACP DISCUSS/DSCN MKR DOCD: CPT | Performed by: INTERNAL MEDICINE

## 2025-07-07 PROCEDURE — 96372 THER/PROPH/DIAG INJ SC/IM: CPT | Performed by: INTERNAL MEDICINE

## 2025-07-07 PROCEDURE — 1160F RVW MEDS BY RX/DR IN RCRD: CPT | Performed by: INTERNAL MEDICINE

## 2025-07-07 PROCEDURE — 1159F MED LIST DOCD IN RCRD: CPT | Performed by: INTERNAL MEDICINE

## 2025-07-07 PROCEDURE — 99214 OFFICE O/P EST MOD 30 MIN: CPT | Performed by: INTERNAL MEDICINE

## 2025-07-07 RX ORDER — CHOLECALCIFEROL (VITAMIN D3) 25 MCG
1000 TABLET ORAL DAILY
Qty: 30 TABLET | Refills: 0
Start: 2025-07-07

## 2025-07-07 NOTE — PROGRESS NOTES
Informed patient if any signs of redness,rash,swelling or unusual symptoms occur, please contact the office. Prolia given per physician order.    Office supplied, subq to FERNIE    NDC 38882-897-97   LOT [id number]  EXP 8/31/2027    
SUBJECTIVE:  Tish Martines is a 69 y.o. female who is being evaluated for osteoporosis.   H.    1. Age-related osteoporosis without current pathological fracture  This started in 2014. Patient was diagnosed with osteoporosis. The problem has been unchanged.   Patient started medication in 2014. Currently patient is on: Evenity. Misses  0 doses a month.  Past medical history of anxiety, back pain, gastric bypass in 1987, status post reversal, lactose intolerance, hypothyroidism, Dumping syndrome.  Can not swallow pills and does no absorb.     Patient was diagnosed with osteoporosis in 2014 after she had fracture of thoracic spine  DEXA scan in November 2014 showed T score of lumbar spine -3.0 and left hip -3.8  DEXA scan in November 2015 showed T score of -3.5.  At left hip and -3.0 at right hip  DEXA scan 2016 in 2018 showed bone density to be stable     History of fractures: She had a fracture of T5 and T7 after a simple fall in 2014  She had fracture of lumbar spine L2-L3-L4 in 2015 after lifting her grandchild     Osteoporosis treatment: Prolia every 6 months [id number]  Forteo 212/2015-09/2017  Prolia 09/2017-09/2019  Evenity 04/2020     Patient had aches and pains with Prolia injection     Family history of osteoporosis: Sister has osteoporosis  No family history of hip fracture  No early menopause, no smoking, no prolonged steroid use     Exercise: Walking     2. Acquired hypothyroidism  Hypothyroidism  On levothyroxine 0.05 mg daily  Current complaints: fatigue, dry skin     History of obstructive symptoms: difficulty swallowing No, changes in voice/hoarseness No.  History of radiation to patient's neck: No  Resent iodine exposure: No  Family history includes no thyroid abnormalities.  Family history of thyroid cancer: No     3. History of compression fracture of spine  Has back pain  History of fractures: She had a fracture of T5 and T7 after a simple fall in 2014  She had fracture of lumbar spine L2-L3-L4 
48